# Patient Record
Sex: FEMALE | Race: OTHER | HISPANIC OR LATINO | Employment: UNEMPLOYED | URBAN - METROPOLITAN AREA
[De-identification: names, ages, dates, MRNs, and addresses within clinical notes are randomized per-mention and may not be internally consistent; named-entity substitution may affect disease eponyms.]

---

## 2023-10-04 ENCOUNTER — APPOINTMENT (EMERGENCY)
Dept: CT IMAGING | Facility: HOSPITAL | Age: 33
End: 2023-10-04

## 2023-10-04 ENCOUNTER — OFFICE VISIT (OUTPATIENT)
Dept: OBGYN CLINIC | Facility: CLINIC | Age: 33
End: 2023-10-04

## 2023-10-04 ENCOUNTER — HOSPITAL ENCOUNTER (EMERGENCY)
Facility: HOSPITAL | Age: 33
Discharge: HOME | End: 2023-10-04
Attending: EMERGENCY MEDICINE

## 2023-10-04 ENCOUNTER — APPOINTMENT (EMERGENCY)
Dept: ULTRASOUND IMAGING | Facility: HOSPITAL | Age: 33
End: 2023-10-04

## 2023-10-04 VITALS
TEMPERATURE: 98.8 F | SYSTOLIC BLOOD PRESSURE: 125 MMHG | DIASTOLIC BLOOD PRESSURE: 74 MMHG | HEART RATE: 64 BPM | OXYGEN SATURATION: 99 % | RESPIRATION RATE: 18 BRPM

## 2023-10-04 VITALS
DIASTOLIC BLOOD PRESSURE: 75 MMHG | SYSTOLIC BLOOD PRESSURE: 110 MMHG | HEIGHT: 59 IN | HEART RATE: 73 BPM | BODY MASS INDEX: 27.34 KG/M2 | WEIGHT: 135.6 LBS

## 2023-10-04 DIAGNOSIS — N84.0 ENDOMETRIAL POLYP: ICD-10-CM

## 2023-10-04 DIAGNOSIS — A74.9 CHLAMYDIA: ICD-10-CM

## 2023-10-04 DIAGNOSIS — R10.2 PELVIC PAIN: Primary | ICD-10-CM

## 2023-10-04 DIAGNOSIS — N93.9 ABNORMAL UTERINE BLEEDING (AUB): Primary | ICD-10-CM

## 2023-10-04 LAB
ALBUMIN SERPL BCP-MCNC: 4.1 G/DL (ref 3.5–5)
ALP SERPL-CCNC: 69 U/L (ref 34–104)
ALT SERPL W P-5'-P-CCNC: 20 U/L (ref 7–52)
ANION GAP SERPL CALCULATED.3IONS-SCNC: 4 MMOL/L
AST SERPL W P-5'-P-CCNC: 18 U/L (ref 13–39)
BASOPHILS # BLD AUTO: 0.02 THOUSANDS/ÂΜL (ref 0–0.1)
BASOPHILS NFR BLD AUTO: 0 % (ref 0–1)
BILIRUB SERPL-MCNC: 0.38 MG/DL (ref 0.2–1)
BILIRUB UR QL STRIP: NEGATIVE
BUN SERPL-MCNC: 10 MG/DL (ref 5–25)
CALCIUM SERPL-MCNC: 8.8 MG/DL (ref 8.4–10.2)
CHLORIDE SERPL-SCNC: 102 MMOL/L (ref 96–108)
CLARITY UR: CLEAR
CO2 SERPL-SCNC: 27 MMOL/L (ref 21–32)
COLOR UR: YELLOW
CREAT SERPL-MCNC: 0.64 MG/DL (ref 0.6–1.3)
EOSINOPHIL # BLD AUTO: 0.08 THOUSAND/ÂΜL (ref 0–0.61)
EOSINOPHIL NFR BLD AUTO: 2 % (ref 0–6)
ERYTHROCYTE [DISTWIDTH] IN BLOOD BY AUTOMATED COUNT: 13.3 % (ref 11.6–15.1)
EXT PREGNANCY TEST URINE: NEGATIVE
EXT. CONTROL: NORMAL
GFR SERPL CREATININE-BSD FRML MDRD: 117 ML/MIN/1.73SQ M
GLUCOSE SERPL-MCNC: 96 MG/DL (ref 65–140)
GLUCOSE UR STRIP-MCNC: NEGATIVE MG/DL
HCT VFR BLD AUTO: 40.3 % (ref 34.8–46.1)
HGB BLD-MCNC: 13.1 G/DL (ref 11.5–15.4)
HGB UR QL STRIP.AUTO: NEGATIVE
IMM GRANULOCYTES # BLD AUTO: 0.01 THOUSAND/UL (ref 0–0.2)
IMM GRANULOCYTES NFR BLD AUTO: 0 % (ref 0–2)
KETONES UR STRIP-MCNC: NEGATIVE MG/DL
LEUKOCYTE ESTERASE UR QL STRIP: NEGATIVE
LYMPHOCYTES # BLD AUTO: 1.92 THOUSANDS/ÂΜL (ref 0.6–4.47)
LYMPHOCYTES NFR BLD AUTO: 43 % (ref 14–44)
MCH RBC QN AUTO: 27.5 PG (ref 26.8–34.3)
MCHC RBC AUTO-ENTMCNC: 32.5 G/DL (ref 31.4–37.4)
MCV RBC AUTO: 85 FL (ref 82–98)
MONOCYTES # BLD AUTO: 0.26 THOUSAND/ÂΜL (ref 0.17–1.22)
MONOCYTES NFR BLD AUTO: 6 % (ref 4–12)
NEUTROPHILS # BLD AUTO: 2.16 THOUSANDS/ÂΜL (ref 1.85–7.62)
NEUTS SEG NFR BLD AUTO: 49 % (ref 43–75)
NITRITE UR QL STRIP: NEGATIVE
NRBC BLD AUTO-RTO: 0 /100 WBCS
PH UR STRIP.AUTO: 6 [PH] (ref 4.5–8)
PLATELET # BLD AUTO: 286 THOUSANDS/UL (ref 149–390)
PMV BLD AUTO: 10.1 FL (ref 8.9–12.7)
POTASSIUM SERPL-SCNC: 4.1 MMOL/L (ref 3.5–5.3)
PROT SERPL-MCNC: 7.8 G/DL (ref 6.4–8.4)
PROT UR STRIP-MCNC: NEGATIVE MG/DL
RBC # BLD AUTO: 4.76 MILLION/UL (ref 3.81–5.12)
SODIUM SERPL-SCNC: 133 MMOL/L (ref 135–147)
SP GR UR STRIP.AUTO: 1.01 (ref 1–1.03)
UROBILINOGEN UR QL STRIP.AUTO: 0.2 E.U./DL
WBC # BLD AUTO: 4.45 THOUSAND/UL (ref 4.31–10.16)

## 2023-10-04 PROCEDURE — 87491 CHLMYD TRACH DNA AMP PROBE: CPT | Performed by: EMERGENCY MEDICINE

## 2023-10-04 PROCEDURE — G1004 CDSM NDSC: HCPCS

## 2023-10-04 PROCEDURE — 76856 US EXAM PELVIC COMPLETE: CPT

## 2023-10-04 PROCEDURE — 36415 COLL VENOUS BLD VENIPUNCTURE: CPT | Performed by: EMERGENCY MEDICINE

## 2023-10-04 PROCEDURE — 99285 EMERGENCY DEPT VISIT HI MDM: CPT | Performed by: EMERGENCY MEDICINE

## 2023-10-04 PROCEDURE — 81025 URINE PREGNANCY TEST: CPT | Performed by: EMERGENCY MEDICINE

## 2023-10-04 PROCEDURE — 85025 COMPLETE CBC W/AUTO DIFF WBC: CPT | Performed by: EMERGENCY MEDICINE

## 2023-10-04 PROCEDURE — 96365 THER/PROPH/DIAG IV INF INIT: CPT

## 2023-10-04 PROCEDURE — 80053 COMPREHEN METABOLIC PANEL: CPT | Performed by: EMERGENCY MEDICINE

## 2023-10-04 PROCEDURE — 76830 TRANSVAGINAL US NON-OB: CPT

## 2023-10-04 PROCEDURE — 81003 URINALYSIS AUTO W/O SCOPE: CPT

## 2023-10-04 PROCEDURE — 99204 OFFICE O/P NEW MOD 45 MIN: CPT | Performed by: OBSTETRICS & GYNECOLOGY

## 2023-10-04 PROCEDURE — 74177 CT ABD & PELVIS W/CONTRAST: CPT

## 2023-10-04 PROCEDURE — 87591 N.GONORRHOEAE DNA AMP PROB: CPT | Performed by: EMERGENCY MEDICINE

## 2023-10-04 PROCEDURE — 96375 TX/PRO/DX INJ NEW DRUG ADDON: CPT

## 2023-10-04 PROCEDURE — 99284 EMERGENCY DEPT VISIT MOD MDM: CPT

## 2023-10-04 RX ORDER — KETOROLAC TROMETHAMINE 30 MG/ML
15 INJECTION, SOLUTION INTRAMUSCULAR; INTRAVENOUS ONCE
Status: COMPLETED | OUTPATIENT
Start: 2023-10-04 | End: 2023-10-04

## 2023-10-04 RX ORDER — IBUPROFEN 400 MG/1
400 TABLET ORAL EVERY 8 HOURS PRN
Qty: 15 TABLET | Refills: 0 | Status: SHIPPED | OUTPATIENT
Start: 2023-10-04

## 2023-10-04 RX ADMIN — KETOROLAC TROMETHAMINE 15 MG: 30 INJECTION, SOLUTION INTRAMUSCULAR; INTRAVENOUS at 07:38

## 2023-10-04 RX ADMIN — IOHEXOL 85 ML: 350 INJECTION, SOLUTION INTRAVENOUS at 08:47

## 2023-10-04 RX ADMIN — SODIUM CHLORIDE, SODIUM LACTATE, POTASSIUM CHLORIDE, AND CALCIUM CHLORIDE 500 ML: .6; .31; .03; .02 INJECTION, SOLUTION INTRAVENOUS at 07:34

## 2023-10-04 NOTE — Clinical Note
Grayson Hernandez accompanied Joon Shankar to the emergency department on 10/4/2023. Return date if applicable: 56/13/4947        If you have any questions or concerns, please don't hesitate to call.       Jean-Paul Yost MD

## 2023-10-04 NOTE — PROGRESS NOTES
OB/GYN VISIT  Bryonjerrod Densonevonnedouglas  10/4/2023  11:46 AM    Subjective:     Tata Curiel is a 35 y.o. G0 female who presents for follow-up after an ED visit today for pelvic pain. In the ED, TVUS showed a 1cm endometrial nodule with vascular stalk consistent with a polyp. CT showed no acute abdominopelvic findings but questionable polycystic appearance of ovaries. Patient states that her pain is in her bilateral lower abdomen and that it comes with the start of each period and persists for 2-3 days after. The pain comes on gradually and feels like a "straining" pain as if something is squeezing her. Her pain makes it hard for her to walk at times. Kermit Goff reports irregular periods that come about every 15 days and last about 5 days. They are often heavy and she changes her pad/tampon every time she goes to the bathroom. She does report leakage/bleed through and has frequent accidents. She has been trying to become pregnant regularly for approximately 1 year now with her partner. She has not noticed any deepening in her voice and sometimes has to pluck dark chin hairs. She has a family history of diabetes in her father and grandmother. She denies any other symptoms such as dysuria, abnormal discharge, dyspareunia, nausea/vomiting, or bowel symptoms. No past medical history on file. Past Surgical History:   Procedure Laterality Date   • KNEE SURGERY         Objective:    Vitals: Blood pressure 110/75, pulse 73, height 4' 11" (1.499 m), weight 61.5 kg (135 lb 9.6 oz), last menstrual period 09/29/2023. Body mass index is 27.39 kg/m². General: Well appearing, no distress  Respiratory: Unlabored breathing  Cardiovascular: Regular rate. Abdomen: Soft, non-distended, mild bilateral lower quadrant abdominal tenderness to palpation  Fundal Height: Appropriate for gestational age. Extremities: Warm and well perfused. Non tender. Assessment/Plan:  1.  Abnormal uterine bleeding (AUB)  Assessment & Plan:  - Likely secondary to endometrial polyp  - Plan for hysteroscopy D&C with polypectomy  - Discussed risks of procedure with patient such as uterine perforation, damage to surrounding organs, bleeding, infection, insufficient sampling  - Consent signed and scheduling form filled out  - Patient will come back for pre-op H&P  - Labs ordered to check hormone levels in case of other causes of bleeding    Orders:  -     TSH, 3rd generation with Free T4 reflex; Future  -     Follicle stimulating hormone; Future  -     Luteinizing hormone; Future  -     Estradiol; Future  -     Prolactin; Future        D/w Dr. Linda Sorto MD  10/4/23  11:46 AM        Please note that while the recent [address] permits medical records be visible for patient review, clinical documentation is intended for utilization by healthcare professionals. All test results are immediately available through [employer] as well, and we will review results at earliest opportunity and contact you with the appropriate urgency. If you have a question about something you see in your chart, please don't hesitate to ask about it at your next appointment.

## 2023-10-04 NOTE — ED PROVIDER NOTES
History  Chief Complaint   Patient presents with   • Pelvic Pain     B/l pelvic pain for two days, LMP "a few days ago", denies urinary s/s       History provided by:  Patient   used: No    Gradual onset worsening lower pelvic/lower abdominal pain. Her last menstrual period was a few days ago and was a little heavier than normal.  No urinary symptoms. No bowel symptoms. No significant nausea or vomiting. No fevers or chills. No prior abdominal surgery. The pain remains in the lower abdomen and has not localized to 1 particular area and does not really think left or right pain is worse. There is been no vaginal discharge. She is here with her . None       History reviewed. No pertinent past medical history. Past Surgical History:   Procedure Laterality Date   • KNEE SURGERY         History reviewed. No pertinent family history. I have reviewed and agree with the history as documented. E-Cigarette/Vaping     E-Cigarette/Vaping Substances     Social History     Substance Use Topics   • Alcohol use: Never   • Drug use: Never       Review of Systems    Physical Exam  Physical Exam  Vitals and nursing note reviewed. Constitutional:       General: She is not in acute distress. Appearance: Normal appearance. She is well-developed. She is not ill-appearing, toxic-appearing or diaphoretic. HENT:      Head: Normocephalic and atraumatic. Right Ear: Hearing normal. No drainage or swelling. Left Ear: Hearing normal. No drainage or swelling. Eyes:      General: Lids are normal.         Right eye: No discharge. Left eye: No discharge. Conjunctiva/sclera: Conjunctivae normal.   Neck:      Vascular: No JVD. Trachea: Trachea normal.   Cardiovascular:      Rate and Rhythm: Normal rate and regular rhythm. Pulses: Normal pulses. Heart sounds: Normal heart sounds. No murmur heard. No friction rub. No gallop.    Pulmonary:      Effort: Pulmonary effort is normal. No respiratory distress. Breath sounds: Normal breath sounds. No stridor. No wheezing or rales. Abdominal:      Palpations: Abdomen is soft. Tenderness: There is abdominal tenderness in the right lower quadrant, suprapubic area and left lower quadrant. There is no right CVA tenderness, left CVA tenderness, guarding or rebound. Negative signs include Hathaway's sign and McBurney's sign. Musculoskeletal:         General: Normal range of motion. Cervical back: Normal range of motion. Skin:     General: Skin is warm and dry. Coloration: Skin is not pale. Neurological:      General: No focal deficit present. Mental Status: She is alert. GCS: GCS eye subscore is 4. GCS verbal subscore is 5. GCS motor subscore is 6. Sensory: No sensory deficit. Motor: No abnormal muscle tone. Psychiatric:         Mood and Affect: Mood normal.         Speech: Speech normal.         Behavior: Behavior is cooperative.          Vital Signs  ED Triage Vitals [10/04/23 0630]   Temperature Pulse Respirations Blood Pressure SpO2   98.8 °F (37.1 °C) 64 18 125/74 99 %      Temp src Heart Rate Source Patient Position - Orthostatic VS BP Location FiO2 (%)   -- Monitor -- Right arm --      Pain Score       7           Vitals:    10/04/23 0630   BP: 125/74   Pulse: 64         Visual Acuity      ED Medications  Medications   ketorolac (TORADOL) injection 15 mg (15 mg Intravenous Given 10/4/23 0738)   lactated ringers bolus 500 mL (0 mL Intravenous Stopped 10/4/23 0840)   iohexol (OMNIPAQUE) 350 MG/ML injection (MULTI-DOSE) 85 mL (85 mL Intravenous Given 10/4/23 0847)       Diagnostic Studies  Results Reviewed     Procedure Component Value Units Date/Time    Comprehensive metabolic panel [[id number]]  (Abnormal) Collected: 10/04/23 0735    Lab Status: Final result Specimen: Blood from Arm, Right Updated: 10/04/23 8319     Sodium 133 mmol/L      Potassium 4.1 mmol/L      Chloride 102 mmol/L CO2 27 mmol/L      ANION GAP 4 mmol/L      BUN 10 mg/dL      Creatinine 0.64 mg/dL      Glucose 96 mg/dL      Calcium 8.8 mg/dL      AST 18 U/L      ALT 20 U/L      Alkaline Phosphatase 69 U/L      Total Protein 7.8 g/dL      Albumin 4.1 g/dL      Total Bilirubin 0.38 mg/dL      eGFR 117 ml/min/1.73sq m     Narrative:      National Kidney Disease Foundation guidelines for Chronic Kidney Disease (CKD):   •  Stage 1 with normal or high GFR (GFR > 90 mL/min/1.73 square meters)  •  Stage 2 Mild CKD (GFR = 60-89 mL/min/1.73 square meters)  •  Stage 3A Moderate CKD (GFR = 45-59 mL/min/1.73 square meters)  •  Stage 3B Moderate CKD (GFR = 30-44 mL/min/1.73 square meters)  •  Stage 4 Severe CKD (GFR = 15-29 mL/min/1.73 square meters)  •  Stage 5 End Stage CKD (GFR <15 mL/min/1.73 square meters)  Note: GFR calculation is accurate only with a steady state creatinine    CBC and differential [[id number]] Collected: 10/04/23 0735    Lab Status: Final result Specimen: Blood from Arm, Right Updated: 10/04/23 0745     WBC 4.45 Thousand/uL      RBC 4.76 Million/uL      Hemoglobin 13.1 g/dL      Hematocrit 40.3 %      MCV 85 fL      MCH 27.5 pg      MCHC 32.5 g/dL      RDW 13.3 %      MPV 10.1 fL      Platelets 445 Thousands/uL      nRBC 0 /100 WBCs      Neutrophils Relative 49 %      Immat GRANS % 0 %      Lymphocytes Relative 43 %      Monocytes Relative 6 %      Eosinophils Relative 2 %      Basophils Relative 0 %      Neutrophils Absolute 2.16 Thousands/µL      Immature Grans Absolute 0.01 Thousand/uL      Lymphocytes Absolute 1.92 Thousands/µL      Monocytes Absolute 0.26 Thousand/µL      Eosinophils Absolute 0.08 Thousand/µL      Basophils Absolute 0.02 Thousands/µL     Chlamydia/GC amplified DNA by PCR [[id number]] Collected: 10/04/23 0725    Lab Status:  In process Specimen: Urine, Other Updated: 10/04/23 0732    POCT pregnancy, urine [[id number]]  (Normal) Resulted: 10/04/23 0731    Lab Status: Final result Updated: 10/04/23 7542     EXT Preg Test, Ur Negative     Control Valid    Urine Macroscopic, POC [[id number]] Collected: 10/04/23 0726    Lab Status: Final result Specimen: Urine Updated: 10/04/23 0728     Color, UA Yellow     Clarity, UA Clear     pH, UA 6.0     Leukocytes, UA Negative     Nitrite, UA Negative     Protein, UA Negative mg/dl      Glucose, UA Negative mg/dl      Ketones, UA Negative mg/dl      Urobilinogen, UA 0.2 E.U./dl      Bilirubin, UA Negative     Occult Blood, UA Negative     Specific Gravity, UA 1.015    Narrative:      CLINITEK RESULT                 US pelvis complete w transvaginal   Final Result by Court Ortiz MD (10/04 1121)         1. 1 cm endometrial nodule with a vascular stalk, most likely a polyp. Other etiologies not excluded. Nonemergent gynecological assessment and follow-up advised. Workstation performed: [id number]         CT abdomen pelvis with contrast   ED Interpretation by Lashawn Dean MD (10/04 4834)   I have reviewed the film, per my independent interpretation : no obstruction or free air. Possible ovarian cyst. Formal read per radiology.'      Final Result by Armani Keys MD (10/04 1923)      No acute abdominopelvic findings. Workstation performed: [id number]                    Procedures  Procedures         ED Course  ED Course as of 10/04/23 1200   Wed Oct 04, 2023   0746 CBC and differential  Normal white count, hemoglobin and platelet count. 2087 PREGNANCY TEST URINE: Negative  Pregnancy negative   0746 Urine Macroscopic, POC  No evidence of UTI   0952 The patients pelvic pain is slightly better. CT reviewed and as she is still having pain, US ordered. Pain not exactly consistent with ovarian torsion. Medical Decision Making  Patient with pelvic pain. There is no UTI. She is not pregnant. There is no ovarian cyst or ovarian torsion evident.   She has not endometrial polyps which I think is an incidental finding most likely not causing pain. There is no signs or symptoms of an STD but I did send off STD testing which is pending upon discharge. She has no OB/GYN doctor and has no insurance so I [facility] and told her she needs to follow-up. She has no fever to suggest an infection at this point. Okay for discharge. Amount and/or Complexity of Data Reviewed  Labs: ordered. Decision-making details documented in ED Course. Radiology: ordered. Risk  Prescription drug management. Disposition  Final diagnoses:   Pelvic pain   Endometrial polyp     Time reflects when diagnosis was documented in both MDM as applicable and the Disposition within this note     Time User Action Codes Description Comment    10/4/2023 11:56 AM Nesha Sers Add [R10.2] Pelvic pain     10/4/2023 11:57 AM Nesha Sers Add [N84.0] Endometrial polyp       ED Disposition     ED Disposition   Discharge    Condition   Stable    Date/Time   Wed Oct 4, 2023 11:56 AM    Comment   [id number] [facility] discharge to home/self care.                Follow-up Information     Follow up With Specialties Details Why [address] [place] [address] Obstetrics and Gynecology Schedule an appointment as soon as possible for a visit in 1 week reevaluation [address] [place] [id number]  [address] [place] [address], [address] [place] [address], [address] [place] [address], [id number] [phone]          Patient's Medications   Discharge Prescriptions    IBUPROFEN (MOTRIN) 400 MG TABLET    Take 1 tablet (400 mg total) by mouth every 8 (eight) hours as needed for mild pain or moderate pain       Start Date: 10/4/2023 End Date: --       Order Dose: 400 mg       Quantity: 15 tablet    Refills: 0           PDMP Review None          ED Provider  Electronically Signed by           Lauro Shelton MD  10/04/23 1200

## 2023-10-05 ENCOUNTER — APPOINTMENT (OUTPATIENT)
Dept: LAB | Facility: CLINIC | Age: 33
End: 2023-10-05

## 2023-10-05 DIAGNOSIS — N93.9 ABNORMAL UTERINE BLEEDING (AUB): ICD-10-CM

## 2023-10-05 LAB
C TRACH DNA SPEC QL NAA+PROBE: POSITIVE
ESTRADIOL SERPL-MCNC: 38.2 PG/ML
FSH SERPL-ACNC: 8.8 MIU/ML
LH SERPL-ACNC: 11.3 MIU/ML
N GONORRHOEA DNA SPEC QL NAA+PROBE: NEGATIVE
PROLACTIN SERPL-MCNC: 11.42 NG/ML (ref 3.34–26.72)
TSH SERPL DL<=0.05 MIU/L-ACNC: 3.01 UIU/ML (ref 0.45–4.5)

## 2023-10-05 PROCEDURE — 84443 ASSAY THYROID STIM HORMONE: CPT

## 2023-10-05 PROCEDURE — 82670 ASSAY OF TOTAL ESTRADIOL: CPT

## 2023-10-05 PROCEDURE — 83001 ASSAY OF GONADOTROPIN (FSH): CPT

## 2023-10-05 PROCEDURE — 83002 ASSAY OF GONADOTROPIN (LH): CPT

## 2023-10-05 PROCEDURE — 84146 ASSAY OF PROLACTIN: CPT

## 2023-10-05 PROCEDURE — 36415 COLL VENOUS BLD VENIPUNCTURE: CPT

## 2023-10-05 NOTE — ASSESSMENT & PLAN NOTE
- Likely secondary to endometrial polyp  - Plan for hysteroscopy D&C with polypectomy  - Discussed risks of procedure with patient such as uterine perforation, damage to surrounding organs, bleeding, infection, insufficient sampling  - Consent signed and scheduling form filled out  - Patient will come back for pre-op H&P  - Labs ordered to check hormone levels in case of other causes of bleeding

## 2023-10-08 NOTE — RESULT ENCOUNTER NOTE
[id number]. Attempted to contact patient. Someone initally answered, however it seemed to be an accident since there was only background noise and no response. Called back and reached voicemail.  LMOM

## 2023-10-09 RX ORDER — DOXYCYCLINE HYCLATE 100 MG/1
100 CAPSULE ORAL 2 TIMES DAILY
Qty: 14 CAPSULE | Refills: 0 | Status: SHIPPED | OUTPATIENT
Start: 2023-10-09 | End: 2023-10-16

## 2023-10-12 ENCOUNTER — TELEPHONE (OUTPATIENT)
Dept: OBGYN CLINIC | Facility: CLINIC | Age: 33
End: 2023-10-12

## 2023-10-16 ENCOUNTER — TELEPHONE (OUTPATIENT)
Dept: LABOR AND DELIVERY | Facility: HOSPITAL | Age: 33
End: 2023-10-16

## 2023-10-16 NOTE — TELEPHONE ENCOUNTER
Called patient and discussed lab results as well as ultrasound results. Plan is for removal of polyp, and then possibility of starting her on a medication to induce ovulation to help with fertility. Patient does state she is having cramping today, suggested using motrin or midol as well as heating pad. Office will call her to schedule pre-op visit.

## 2023-10-31 DIAGNOSIS — A74.9 CHLAMYDIA: Primary | ICD-10-CM

## 2023-10-31 NOTE — PROGRESS NOTES
Patient's  Amado Rashid had appointment with me today, she has no insurance and no PCP, tested positive for chlamydia on 10/4/23 and was treated in ER. Ordered test of cure urine for patient, printed to be completed. Patient reports compliance with 7 day treatment of doxycycline.

## 2023-11-21 ENCOUNTER — TELEPHONE (OUTPATIENT)
Dept: OBGYN CLINIC | Facility: CLINIC | Age: 33
End: 2023-11-21

## 2023-11-22 LAB
EXTERNAL CHLAMYDIA RESULT: NOT DETECTED
N GONORRHOEA RRNA SPEC QL PROBE: NOT DETECTED

## 2023-11-27 ENCOUNTER — PREP FOR PROCEDURE (OUTPATIENT)
Dept: GYNECOLOGY | Facility: CLINIC | Age: 33
End: 2023-11-27

## 2023-11-27 DIAGNOSIS — N93.9 ABNORMAL UTERINE BLEEDING (AUB): ICD-10-CM

## 2023-11-27 DIAGNOSIS — R10.2 PELVIC PAIN: ICD-10-CM

## 2023-11-27 DIAGNOSIS — D25.9 UTERINE LEIOMYOMA, UNSPECIFIED LOCATION: Primary | ICD-10-CM

## 2023-11-28 ENCOUNTER — TELEPHONE (OUTPATIENT)
Dept: FAMILY MEDICINE CLINIC | Facility: CLINIC | Age: 33
End: 2023-11-28

## 2023-11-28 ENCOUNTER — OFFICE VISIT (OUTPATIENT)
Dept: OBGYN CLINIC | Facility: CLINIC | Age: 33
End: 2023-11-28

## 2023-11-28 VITALS
BODY MASS INDEX: 27.63 KG/M2 | SYSTOLIC BLOOD PRESSURE: 110 MMHG | WEIGHT: 136.8 LBS | HEART RATE: 79 BPM | DIASTOLIC BLOOD PRESSURE: 73 MMHG

## 2023-11-28 DIAGNOSIS — Z01.818 PREOPERATIVE EXAMINATION: Primary | ICD-10-CM

## 2023-11-28 DIAGNOSIS — N93.9 ABNORMAL UTERINE BLEEDING (AUB): ICD-10-CM

## 2023-11-28 PROCEDURE — 99214 OFFICE O/P EST MOD 30 MIN: CPT | Performed by: OBSTETRICS & GYNECOLOGY

## 2023-11-28 RX ORDER — MISOPROSTOL 200 UG/1
TABLET ORAL
Qty: 2 TABLET | Refills: 0 | Status: SHIPPED | OUTPATIENT
Start: 2023-11-28

## 2023-11-28 NOTE — H&P (VIEW-ONLY)
Assessment Diagnoses and all orders for this visit:    Preoperative examination  -     miSOPROStol (Cytotec) 200 mcg tablet; [place] yaz tableta por via oral la noche anterior al precedimiento luego tome un inserto de tableta por via intravaginal la manana del procedimiento a las 5:30am    Abnormal uterine bleeding (AUB)  -     miSOPROStol (Cytotec) 200 mcg tablet; [place] yaz tableta por via oral la noche anterior al precedimiento luego tome un inserto de tableta por via intravaginal la manana del procedimiento a las 5:30am         Tian Ulrich is scheduled for  [facility]  Hysteroscopy with polypectomy  on 12/1/23. Pre-op instructions, including showering with Hibiclens, discussed with patient and patient received paper copy. The risks, benefits and alternatives to the procedure were discussed. We discussed the risks of pain, bleeding, blood clot, infection, allergic reaction, neurovascular injury, injury to uterus, injury to surrounding structures such as bowel, bladder and/or ureters, and possibility of inability to complete the procedure. We discussed code status - full code. Blood transfusion is acceptable. We discussed resident physician participation in the procedure, including pelvic exam.  All questions answered, consent obtained. Subjective     Cristina Ulrich is a 35 y.o. female here for a pre-op consultation. She is without complaint today. Patient Active Problem List   Diagnosis    Abnormal uterine bleeding (AUB)         Gynecologic History  Patient's last menstrual period was 11/23/2023 (exact date). Contraception: none      Obstetric History  OB History   No obstetric history on file. Past Medical/Surgical/Family/Social History    No past medical history on file. Past Surgical History:   Procedure Laterality Date    KNEE SURGERY       No family history on file.   Social History     Socioeconomic History    Marital status: Single     Spouse name: Not on file    Number of children: Not on file    Years of education: Not on file    Highest education level: Not on file   Occupational History    Not on file   Tobacco Use    Smoking status: Not on file    Smokeless tobacco: Not on file   Substance and Sexual Activity    Alcohol use: Never    Drug use: Never    Sexual activity: Yes   Other Topics Concern    Not on file   Social History Narrative    Not on file     Social Determinants of Health     Financial Resource Strain: Low Risk  (10/4/2023)    Overall Financial Resource Strain (CARDIA)     Difficulty of Paying Living Expenses: Not hard at all   Food Insecurity: No Food Insecurity (10/4/2023)    Hunger Vital Sign     Worried About Running Out of Food in the Last Year: Never true     [address] in the Last Year: Never true   Transportation Needs: No Transportation Needs (10/4/2023)    PRAPARE - Transportation     Lack of Transportation (Medical): No     Lack of Transportation (Non-Medical): No   Physical Activity: Not on file   Stress: Not on file   Social Connections: Not on file   Intimate Partner Violence: Not on file   Housing Stability: Not on file         Patient has no known allergies.     Current Outpatient Medications:     ibuprofen (MOTRIN) 400 mg tablet, Take 1 tablet (400 mg total) by mouth every 8 (eight) hours as needed for mild pain or moderate pain, Disp: 15 tablet, Rfl: 0    miSOPROStol (Cytotec) 200 mcg tablet, [place] yaz tableta por via oral la noche anterior al precedimiento luego tome un inserto de tableta por via intravaginal la manana del procedimiento a las 5:30am, Disp: 2 tablet, Rfl: 0      Review of Systems  Constitutional: no fever, feels well  CV: No complaints of chest pain, palpitations  Pulm: No shortness of breath or dyspnea on exertion  Gastrointestinal: no complaints of abdominal pain, nausea  Genitourinary : no complaints of dysuria, vaginal discharge       Objective     /73   Pulse 79   Wt 62.1 kg (136 lb 12.8 oz) LMP 11/23/2023 (Exact Date)   BMI 27.63 kg/m²     GEN: The patient was alert and oriented x3, pleasant well-appearing female in no acute distress.    CV: Regular rate and rhythm  PULM: nonlabored respirations, CTAB  ABD: BS positive, soft, nontender  : deferred  EXT: No calf tenderness  MSK: Normal gait  Skin: warm, dry  Neuro: no focal deficits  Psych: normal affect and judgement, cooperative

## 2023-11-28 NOTE — PROGRESS NOTES
Assessment Diagnoses and all orders for this visit:    Preoperative examination  -     miSOPROStol (Cytotec) 200 mcg tablet; [place] yaz tableta por via oral la noche anterior al precedimiento luego tome un inserto de tableta por via intravaginal la manana del procedimiento a las 5:30am    Abnormal uterine bleeding (AUB)  -     miSOPROStol (Cytotec) 200 mcg tablet; [place] yaz tableta por via oral la noche anterior al precedimiento luego tome un inserto de tableta por via intravaginal la manana del procedimiento a las 5:30am         Tian Ulrich is scheduled for  [facility]  Hysteroscopy with polypectomy  on 12/1/23. Pre-op instructions, including showering with Hibiclens, discussed with patient and patient received paper copy. The risks, benefits and alternatives to the procedure were discussed. We discussed the risks of pain, bleeding, blood clot, infection, allergic reaction, neurovascular injury, injury to uterus, injury to surrounding structures such as bowel, bladder and/or ureters, and possibility of inability to complete the procedure. We discussed code status - full code. Blood transfusion is acceptable. We discussed resident physician participation in the procedure, including pelvic exam.  All questions answered, consent obtained. Subjective     Cristina Ulrich is a 35 y.o. female here for a pre-op consultation. She is without complaint today. Patient Active Problem List   Diagnosis    Abnormal uterine bleeding (AUB)         Gynecologic History  Patient's last menstrual period was 11/23/2023 (exact date). Contraception: none      Obstetric History  OB History   No obstetric history on file. Past Medical/Surgical/Family/Social History    No past medical history on file. Past Surgical History:   Procedure Laterality Date    KNEE SURGERY       No family history on file.   Social History     Socioeconomic History    Marital status: Single     Spouse name: Not on file    Number of children: Not on file    Years of education: Not on file    Highest education level: Not on file   Occupational History    Not on file   Tobacco Use    Smoking status: Not on file    Smokeless tobacco: Not on file   Substance and Sexual Activity    Alcohol use: Never    Drug use: Never    Sexual activity: Yes   Other Topics Concern    Not on file   Social History Narrative    Not on file     Social Determinants of Health     Financial Resource Strain: Low Risk  (10/4/2023)    Overall Financial Resource Strain (CARDIA)     Difficulty of Paying Living Expenses: Not hard at all   Food Insecurity: No Food Insecurity (10/4/2023)    Hunger Vital Sign     Worried About Running Out of Food in the Last Year: Never true     [address] in the Last Year: Never true   Transportation Needs: No Transportation Needs (10/4/2023)    PRAPARE - Transportation     Lack of Transportation (Medical): No     Lack of Transportation (Non-Medical): No   Physical Activity: Not on file   Stress: Not on file   Social Connections: Not on file   Intimate Partner Violence: Not on file   Housing Stability: Not on file         Patient has no known allergies.     Current Outpatient Medications:     ibuprofen (MOTRIN) 400 mg tablet, Take 1 tablet (400 mg total) by mouth every 8 (eight) hours as needed for mild pain or moderate pain, Disp: 15 tablet, Rfl: 0    miSOPROStol (Cytotec) 200 mcg tablet, [place] yaz tableta por via oral la noche anterior al precedimiento luego tome un inserto de tableta por via intravaginal la manana del procedimiento a las 5:30am, Disp: 2 tablet, Rfl: 0      Review of Systems  Constitutional: no fever, feels well  CV: No complaints of chest pain, palpitations  Pulm: No shortness of breath or dyspnea on exertion  Gastrointestinal: no complaints of abdominal pain, nausea  Genitourinary : no complaints of dysuria, vaginal discharge       Objective     /73   Pulse 79   Wt 62.1 kg (136 lb 12.8 oz) LMP 11/23/2023 (Exact Date)   BMI 27.63 kg/m²     GEN: The patient was alert and oriented x3, pleasant well-appearing female in no acute distress.    CV: Regular rate and rhythm  PULM: nonlabored respirations, CTAB  ABD: BS positive, soft, nontender  : deferred  EXT: No calf tenderness  MSK: Normal gait  Skin: warm, dry  Neuro: no focal deficits  Psych: normal affect and judgement, cooperative

## 2023-11-28 NOTE — PATIENT INSTRUCTIONS
Donavan por cervantes confianza en nuestro equipo. Leandrew Nigh y agradecemos campos comentarios. Si recibe yaz encuesta nuestra, tómese unos momentos para informarnos cómo estamos.    Sinceramente,  [facility], DO

## 2023-11-29 ENCOUNTER — ANESTHESIA EVENT (OUTPATIENT)
Dept: PERIOP | Facility: HOSPITAL | Age: 33
End: 2023-11-29
Payer: COMMERCIAL

## 2023-11-29 NOTE — PRE-PROCEDURE INSTRUCTIONS
Pre-Surgery Instructions:   Medication Instructions    ibuprofen (MOTRIN) 400 mg tablet Stop taking 2 days prior to surgery. miSOPROStol (Cytotec) 200 mcg tablet Instructions provided by MD   Phone assessment done 2 days prior to surgery. Medication instructions for day surgery reviewed. Please use only a sip of water to take your instructed medications. Avoid all over the counter vitamins, supplements and NSAIDS for one week prior to surgery per anesthesia guidelines. Tylenol is ok to take as needed. You will receive a call one business day prior to surgery with an arrival time and hospital directions. If your surgery is scheduled on a Monday, the hospital will be calling you on the Friday prior to your surgery. If you have not heard from anyone by 8pm, please call the hospital supervisor through the hospital  at [phone]. Ricardo Mcdonnell [phone]). Do not eat or drink anything after midnight the night before your surgery, including candy, mints, lifesavers, or chewing gum. Do not drink alcohol 24hrs before your surgery. Try not to smoke at least 24hrs before your surgery. Follow the pre surgery showering instructions as listed in the [facility] Surgical Experience Booklet” or otherwise provided by your surgeon's office. Do not use a blade to shave the surgical area 1 week before surgery. It is okay to use a clean electric clippers up to 24 hours before surgery. Do not apply any lotions, creams, including makeup, cologne, deodorant, or perfumes after showering on the day of your surgery. Do not use dry shampoo, hair spray, hair gel, or any type of hair products. No contact lenses, eye make-up, or artificial eyelashes. Remove nail polish, including gel polish, and any artificial, gel, or acrylic nails if possible. Remove all jewelry including rings and body piercing jewelry. Wear causal clothing that is easy to take on and off. Consider your type of surgery.     Keep any valuables, jewelry, piercings at home. Please bring any specially ordered equipment (sling, braces) if indicated. Arrange for a responsible person to drive you to and from the hospital on the day of your surgery. Visitor Guidelines discussed. Call the surgeon's office with any new illnesses, exposures, or additional questions prior to surgery. Please reference your [facility] Surgical Experience Booklet” for additional information to prepare for your upcoming surgery.

## 2023-12-01 ENCOUNTER — HOSPITAL ENCOUNTER (OUTPATIENT)
Facility: HOSPITAL | Age: 33
Setting detail: OUTPATIENT SURGERY
Discharge: HOME | End: 2023-12-01
Attending: OBSTETRICS & GYNECOLOGY | Admitting: OBSTETRICS & GYNECOLOGY
Payer: COMMERCIAL

## 2023-12-01 ENCOUNTER — ANESTHESIA (OUTPATIENT)
Dept: PERIOP | Facility: HOSPITAL | Age: 33
End: 2023-12-01
Payer: COMMERCIAL

## 2023-12-01 VITALS
BODY MASS INDEX: 27.87 KG/M2 | RESPIRATION RATE: 17 BRPM | TEMPERATURE: 97.7 F | OXYGEN SATURATION: 98 % | HEART RATE: 70 BPM | WEIGHT: 138.01 LBS | SYSTOLIC BLOOD PRESSURE: 111 MMHG | DIASTOLIC BLOOD PRESSURE: 62 MMHG

## 2023-12-01 DIAGNOSIS — N93.9 ABNORMAL UTERINE BLEEDING (AUB): ICD-10-CM

## 2023-12-01 DIAGNOSIS — R10.2 PELVIC PAIN: ICD-10-CM

## 2023-12-01 DIAGNOSIS — D25.9 UTERINE LEIOMYOMA, UNSPECIFIED LOCATION: ICD-10-CM

## 2023-12-01 LAB
ABO GROUP BLD: NORMAL
ABO GROUP BLD: NORMAL
ANION GAP SERPL CALCULATED.3IONS-SCNC: 5 MMOL/L
BASOPHILS # BLD AUTO: 0.03 THOUSANDS/ÂΜL (ref 0–0.1)
BASOPHILS NFR BLD AUTO: 0 % (ref 0–1)
BLD GP AB SCN SERPL QL: NEGATIVE
BUN SERPL-MCNC: 11 MG/DL (ref 5–25)
CALCIUM SERPL-MCNC: 8.6 MG/DL (ref 8.4–10.2)
CHLORIDE SERPL-SCNC: 102 MMOL/L (ref 96–108)
CO2 SERPL-SCNC: 26 MMOL/L (ref 21–32)
CREAT SERPL-MCNC: 0.68 MG/DL (ref 0.6–1.3)
EOSINOPHIL # BLD AUTO: 0.12 THOUSAND/ÂΜL (ref 0–0.61)
EOSINOPHIL NFR BLD AUTO: 1 % (ref 0–6)
ERYTHROCYTE [DISTWIDTH] IN BLOOD BY AUTOMATED COUNT: 13.4 % (ref 11.6–15.1)
EXT PREGNANCY TEST URINE: NEGATIVE
EXT. CONTROL: NORMAL
GFR SERPL CREATININE-BSD FRML MDRD: 115 ML/MIN/1.73SQ M
GLUCOSE P FAST SERPL-MCNC: 101 MG/DL (ref 65–99)
GLUCOSE SERPL-MCNC: 101 MG/DL (ref 65–140)
HCT VFR BLD AUTO: 36.5 % (ref 34.8–46.1)
HGB BLD-MCNC: 12.6 G/DL (ref 11.5–15.4)
IMM GRANULOCYTES # BLD AUTO: 0.14 THOUSAND/UL (ref 0–0.2)
IMM GRANULOCYTES NFR BLD AUTO: 2 % (ref 0–2)
LYMPHOCYTES # BLD AUTO: 1.72 THOUSANDS/ÂΜL (ref 0.6–4.47)
LYMPHOCYTES NFR BLD AUTO: 21 % (ref 14–44)
MCH RBC QN AUTO: 28.6 PG (ref 26.8–34.3)
MCHC RBC AUTO-ENTMCNC: 34.5 G/DL (ref 31.4–37.4)
MCV RBC AUTO: 83 FL (ref 82–98)
MONOCYTES # BLD AUTO: 0.58 THOUSAND/ÂΜL (ref 0.17–1.22)
MONOCYTES NFR BLD AUTO: 7 % (ref 4–12)
NEUTROPHILS # BLD AUTO: 5.82 THOUSANDS/ÂΜL (ref 1.85–7.62)
NEUTS SEG NFR BLD AUTO: 69 % (ref 43–75)
NRBC BLD AUTO-RTO: 0 /100 WBCS
PLATELET # BLD AUTO: 294 THOUSANDS/UL (ref 149–390)
PMV BLD AUTO: 10.1 FL (ref 8.9–12.7)
POTASSIUM SERPL-SCNC: 4.1 MMOL/L (ref 3.5–5.3)
RBC # BLD AUTO: 4.4 MILLION/UL (ref 3.81–5.12)
RH BLD: POSITIVE
RH BLD: POSITIVE
SODIUM SERPL-SCNC: 133 MMOL/L (ref 135–147)
SPECIMEN EXPIRATION DATE: NORMAL
WBC # BLD AUTO: 8.41 THOUSAND/UL (ref 4.31–10.16)

## 2023-12-01 PROCEDURE — 88305 TISSUE EXAM BY PATHOLOGIST: CPT | Performed by: PATHOLOGY

## 2023-12-01 PROCEDURE — 81025 URINE PREGNANCY TEST: CPT | Performed by: ANESTHESIOLOGY

## 2023-12-01 PROCEDURE — 85025 COMPLETE CBC W/AUTO DIFF WBC: CPT | Performed by: OBSTETRICS & GYNECOLOGY

## 2023-12-01 PROCEDURE — 86901 BLOOD TYPING SEROLOGIC RH(D): CPT | Performed by: OBSTETRICS & GYNECOLOGY

## 2023-12-01 PROCEDURE — 86900 BLOOD TYPING SEROLOGIC ABO: CPT | Performed by: OBSTETRICS & GYNECOLOGY

## 2023-12-01 PROCEDURE — 58558 HYSTEROSCOPY BIOPSY: CPT | Performed by: OBSTETRICS & GYNECOLOGY

## 2023-12-01 PROCEDURE — 86850 RBC ANTIBODY SCREEN: CPT | Performed by: OBSTETRICS & GYNECOLOGY

## 2023-12-01 PROCEDURE — 80048 BASIC METABOLIC PNL TOTAL CA: CPT | Performed by: OBSTETRICS & GYNECOLOGY

## 2023-12-01 RX ORDER — FENTANYL CITRATE 50 UG/ML
INJECTION, SOLUTION INTRAMUSCULAR; INTRAVENOUS AS NEEDED
Status: DISCONTINUED | OUTPATIENT
Start: 2023-12-01 | End: 2023-12-01

## 2023-12-01 RX ORDER — ONDANSETRON 2 MG/ML
INJECTION INTRAMUSCULAR; INTRAVENOUS AS NEEDED
Status: DISCONTINUED | OUTPATIENT
Start: 2023-12-01 | End: 2023-12-01

## 2023-12-01 RX ORDER — ONDANSETRON 2 MG/ML
4 INJECTION INTRAMUSCULAR; INTRAVENOUS EVERY 6 HOURS PRN
Status: DISCONTINUED | OUTPATIENT
Start: 2023-12-01 | End: 2023-12-01 | Stop reason: HOSPADM

## 2023-12-01 RX ORDER — PROPOFOL 10 MG/ML
INJECTION, EMULSION INTRAVENOUS AS NEEDED
Status: DISCONTINUED | OUTPATIENT
Start: 2023-12-01 | End: 2023-12-01

## 2023-12-01 RX ORDER — ACETAMINOPHEN 325 MG/1
975 TABLET ORAL EVERY 6 HOURS PRN
Status: DISCONTINUED | OUTPATIENT
Start: 2023-12-01 | End: 2023-12-01 | Stop reason: HOSPADM

## 2023-12-01 RX ORDER — ONDANSETRON 2 MG/ML
4 INJECTION INTRAMUSCULAR; INTRAVENOUS ONCE AS NEEDED
Status: DISCONTINUED | OUTPATIENT
Start: 2023-12-01 | End: 2023-12-01 | Stop reason: HOSPADM

## 2023-12-01 RX ORDER — MIDAZOLAM HYDROCHLORIDE 2 MG/2ML
INJECTION, SOLUTION INTRAMUSCULAR; INTRAVENOUS AS NEEDED
Status: DISCONTINUED | OUTPATIENT
Start: 2023-12-01 | End: 2023-12-01

## 2023-12-01 RX ORDER — FENTANYL CITRATE/PF 50 MCG/ML
25 SYRINGE (ML) INJECTION
Status: DISCONTINUED | OUTPATIENT
Start: 2023-12-01 | End: 2023-12-01 | Stop reason: HOSPADM

## 2023-12-01 RX ORDER — DEXAMETHASONE SODIUM PHOSPHATE 10 MG/ML
INJECTION, SOLUTION INTRAMUSCULAR; INTRAVENOUS AS NEEDED
Status: DISCONTINUED | OUTPATIENT
Start: 2023-12-01 | End: 2023-12-01

## 2023-12-01 RX ORDER — SODIUM CHLORIDE, SODIUM LACTATE, POTASSIUM CHLORIDE, CALCIUM CHLORIDE 600; 310; 30; 20 MG/100ML; MG/100ML; MG/100ML; MG/100ML
125 INJECTION, SOLUTION INTRAVENOUS CONTINUOUS
Status: DISCONTINUED | OUTPATIENT
Start: 2023-12-01 | End: 2023-12-01 | Stop reason: HOSPADM

## 2023-12-01 RX ORDER — MAGNESIUM HYDROXIDE 1200 MG/15ML
LIQUID ORAL AS NEEDED
Status: DISCONTINUED | OUTPATIENT
Start: 2023-12-01 | End: 2023-12-01 | Stop reason: HOSPADM

## 2023-12-01 RX ORDER — HYDROMORPHONE HCL/PF 1 MG/ML
0.5 SYRINGE (ML) INJECTION
Status: DISCONTINUED | OUTPATIENT
Start: 2023-12-01 | End: 2023-12-01 | Stop reason: HOSPADM

## 2023-12-01 RX ORDER — LIDOCAINE HYDROCHLORIDE 20 MG/ML
INJECTION, SOLUTION EPIDURAL; INFILTRATION; INTRACAUDAL; PERINEURAL AS NEEDED
Status: DISCONTINUED | OUTPATIENT
Start: 2023-12-01 | End: 2023-12-01

## 2023-12-01 RX ORDER — MEPERIDINE HYDROCHLORIDE 25 MG/ML
12.5 INJECTION INTRAMUSCULAR; INTRAVENOUS; SUBCUTANEOUS
Status: DISCONTINUED | OUTPATIENT
Start: 2023-12-01 | End: 2023-12-01 | Stop reason: HOSPADM

## 2023-12-01 RX ORDER — IBUPROFEN 600 MG/1
600 TABLET ORAL EVERY 6 HOURS PRN
Status: DISCONTINUED | OUTPATIENT
Start: 2023-12-01 | End: 2023-12-01 | Stop reason: HOSPADM

## 2023-12-01 RX ORDER — KETOROLAC TROMETHAMINE 30 MG/ML
INJECTION, SOLUTION INTRAMUSCULAR; INTRAVENOUS AS NEEDED
Status: DISCONTINUED | OUTPATIENT
Start: 2023-12-01 | End: 2023-12-01

## 2023-12-01 RX ADMIN — SODIUM CHLORIDE, SODIUM LACTATE, POTASSIUM CHLORIDE, AND CALCIUM CHLORIDE: .6; .31; .03; .02 INJECTION, SOLUTION INTRAVENOUS at 10:31

## 2023-12-01 RX ADMIN — SODIUM CHLORIDE, SODIUM LACTATE, POTASSIUM CHLORIDE, AND CALCIUM CHLORIDE 125 ML/HR: .6; .31; .03; .02 INJECTION, SOLUTION INTRAVENOUS at 08:14

## 2023-12-01 RX ADMIN — PROPOFOL 200 MG: 10 INJECTION, EMULSION INTRAVENOUS at 09:55

## 2023-12-01 RX ADMIN — FENTANYL CITRATE 50 MCG: 50 INJECTION INTRAMUSCULAR; INTRAVENOUS at 10:00

## 2023-12-01 RX ADMIN — SODIUM CHLORIDE, SODIUM LACTATE, POTASSIUM CHLORIDE, AND CALCIUM CHLORIDE 125 ML/HR: .6; .31; .03; .02 INJECTION, SOLUTION INTRAVENOUS at 11:16

## 2023-12-01 RX ADMIN — IBUPROFEN 600 MG: 600 TABLET, FILM COATED ORAL at 12:21

## 2023-12-01 RX ADMIN — FENTANYL CITRATE 50 MCG: 50 INJECTION INTRAMUSCULAR; INTRAVENOUS at 10:26

## 2023-12-01 RX ADMIN — KETOROLAC TROMETHAMINE 30 MG: 30 INJECTION, SOLUTION INTRAMUSCULAR at 10:30

## 2023-12-01 RX ADMIN — MIDAZOLAM 2 MG: 1 INJECTION INTRAMUSCULAR; INTRAVENOUS at 09:50

## 2023-12-01 RX ADMIN — LIDOCAINE HYDROCHLORIDE 100 MG: 20 INJECTION, SOLUTION EPIDURAL; INFILTRATION; INTRACAUDAL at 09:55

## 2023-12-01 RX ADMIN — ONDANSETRON 4 MG: 2 INJECTION INTRAMUSCULAR; INTRAVENOUS at 10:25

## 2023-12-01 RX ADMIN — DEXAMETHASONE SODIUM PHOSPHATE 10 MG: 10 INJECTION INTRAMUSCULAR; INTRAVENOUS at 10:00

## 2023-12-01 NOTE — OP NOTE
OPERATIVE REPORT  PATIENT NAME: Delores Fernandez    :  1990  MRN: [id number]  Pt Location: [facility] OR ROOM 04    SURGERY DATE: 2023    Surgeon(s) and Role:     Louanne Camera Toussaint-Foster, DO - Primary     * Alea Mendez MD - Assisting    Preop Diagnosis:  Uterine leiomyoma, unspecified location [D25.9]  Abnormal uterine bleeding (AUB) [N93.9]  Pelvic pain [R10.2]    Post-Op Diagnosis Codes:     * Uterine leiomyoma, unspecified location [D25.9]     * Abnormal uterine bleeding (AUB) [N93.9]     * Pelvic pain [R10.2]    Procedure(s) (LRB):  HYSTEROSCOPY W/  RESECTION UTERINE TUMOR/FIBROID (N/A)    Specimen(s):  ID Type Source Tests Collected by Time Destination   1 : [facility] Tissue Endometrium TISSUE EXAM Nella Green DO 2023 1025        Surgical QBL:  5cc    Drains:  None    Anesthesia Type:   General    Operative Indications:  Uterine leiomyoma, unspecified location [D25.9]  Abnormal uterine bleeding (AUB) [N93.9]  Pelvic pain [R10.2]     Operative Findings:  Normal external female genitalia  Normal size, anteverted uterus  Normal appearing parous cervix  Normal vagina  Proliferative endometrium without polyps or discrete masses    Complications:   None    Procedure and Technique:  Patient was taken to the operating room. General LMA anesthesia (LMA) was administered and the patient was positioned on the OR table in the dorsal lithotomy position. All pressure points were padded and a desmond hugger was placed to maintain control of core body temperature. A bimanual exam was performed and the uterus was noted to be anteverted, normal in size and consistency with no palpable adnexal masses or fullness. The patient was prepped and draped in the usual sterile fashion. A time out was performed to confirm correct patient and correct procedure. A straight catheter was introduced into the bladder, which was drained of 100cc of clear yellow urine.  A bivalved speculum was inserted into the vagina to visualize the anterior lip of the cervix, which was then grasped with a single toothed tenaculum. The uterus was sounded to 8cm. The cervix was serially dilated to 12 [place] for introduction of the hysteroscope. Hysteroscope was introduced under direct visualization using normal saline solution as the distention media. Hysteroscope was advanced to the uterine fundus and the entire uterine cavity was inspected in a systematic manner. There was noted to be proliferative endometrium. Hysteroscope was withdrawn and sharp curetting was performed, starting at the 12'oclock position and rotating a total of 360 degrees to cover all surfaces. Endometrial tissue was obtained and sent for pathology. The single toothed tenaculum was removed from the anterior lip of the cervix. Good hemostasis was confirmed at the tenaculum puncture sites. Weighted speculum was then removed from the vagina. At the conclusion of the procedure, all needle, sponge, and instrument counts were noted to be correct x2. Dr. Madisyn Makcey was present and participated in all key portions of the case.     Patient Disposition:  PACU         SIGNATURE: Mo Diaz MD  DATE: December 1, 2023  TIME: 10:30 AM

## 2023-12-01 NOTE — ANESTHESIA POSTPROCEDURE EVALUATION
Post-Op Assessment Note    CV Status:  Stable    Pain management: adequate       Mental Status:  Alert and awake   Hydration Status:  Euvolemic   PONV Controlled:  Controlled   Airway Patency:  Patent     Post Op Vitals Reviewed: Yes    No anethesia notable event occurred.     Staff: Anesthesiologist               BP      Temp      Pulse     Resp      SpO2      /62   Pulse 70   Temp 97.7 °F (36.5 °C)   Resp 17   Wt 62.6 kg (138 lb 0.1 oz)   LMP 11/23/2023   SpO2 98%   BMI 27.87 kg/m²

## 2023-12-01 NOTE — ANESTHESIA PREPROCEDURE EVALUATION
Procedure:  HYSTEROSCOPY W/  RESECTION UTERINE TUMOR/FIBROID (Uterus)    Relevant Problems   ANESTHESIA (within normal limits)      CARDIO (within normal limits)      ENDO (within normal limits)      GI/HEPATIC (within normal limits)      /RENAL (within normal limits)      GYN (within normal limits)      HEMATOLOGY (within normal limits)      MUSCULOSKELETAL (within normal limits)      NEURO/PSYCH (within normal limits)      PULMONARY (within normal limits)        Physical Exam    Airway    Mallampati score: II  TM Distance: >3 FB  Neck ROM: full     Dental   No notable dental hx     Cardiovascular  Rhythm: regular, Rate: normal, Cardiovascular exam normal    Pulmonary  Pulmonary exam normal Breath sounds clear to auscultation    Other Findings  post-pubertal.      Anesthesia Plan  ASA Score- 1     Anesthesia Type- general with ASA Monitors. Additional Monitors:     Airway Plan: LMA. Plan Factors-Exercise tolerance (METS): >4 METS. Chart reviewed. Existing labs reviewed. Patient summary reviewed. Patient is not a current smoker. Induction- intravenous. Postoperative Plan-     Informed Consent- Anesthetic plan and risks discussed with patient.

## 2023-12-01 NOTE — INTERVAL H&P NOTE
H&P reviewed. After examining the patient I find no changes in the patients condition since the H&P had been written.     Vitals:    12/01/23 0815   BP: 114/69   Pulse: 93   Resp: 16   Temp: 97.7 °F (36.5 °C)   SpO2: 96%

## 2023-12-06 PROCEDURE — 88305 TISSUE EXAM BY PATHOLOGIST: CPT | Performed by: PATHOLOGY

## 2023-12-14 ENCOUNTER — OFFICE VISIT (OUTPATIENT)
Dept: OBGYN CLINIC | Facility: CLINIC | Age: 33
End: 2023-12-14

## 2023-12-14 VITALS
HEART RATE: 89 BPM | DIASTOLIC BLOOD PRESSURE: 70 MMHG | SYSTOLIC BLOOD PRESSURE: 106 MMHG | WEIGHT: 137.6 LBS | BODY MASS INDEX: 27.74 KG/M2 | HEIGHT: 59 IN

## 2023-12-14 DIAGNOSIS — R10.9 ACUTE RIGHT FLANK PAIN: ICD-10-CM

## 2023-12-14 DIAGNOSIS — Z09 POSTOP CHECK: Primary | ICD-10-CM

## 2023-12-14 DIAGNOSIS — R10.2 PELVIC PAIN: ICD-10-CM

## 2023-12-14 LAB
SL AMB  POCT GLUCOSE, UA: NEGATIVE
SL AMB LEUKOCYTE ESTERASE,UA: NEGATIVE
SL AMB POCT BILIRUBIN,UA: NEGATIVE
SL AMB POCT BLOOD,UA: NEGATIVE
SL AMB POCT CLARITY,UA: ABNORMAL
SL AMB POCT COLOR,UA: YELLOW
SL AMB POCT KETONES,UA: NEGATIVE
SL AMB POCT NITRITE,UA: NEGATIVE
SL AMB POCT PH,UA: 5
SL AMB POCT SPECIFIC GRAVITY,UA: 1.03
SL AMB POCT URINE PROTEIN: ABNORMAL
SL AMB POCT UROBILINOGEN: NEGATIVE

## 2023-12-14 PROCEDURE — 81002 URINALYSIS NONAUTO W/O SCOPE: CPT | Performed by: OBSTETRICS & GYNECOLOGY

## 2023-12-14 PROCEDURE — 87086 URINE CULTURE/COLONY COUNT: CPT | Performed by: OBSTETRICS & GYNECOLOGY

## 2023-12-14 PROCEDURE — 99213 OFFICE O/P EST LOW 20 MIN: CPT | Performed by: OBSTETRICS & GYNECOLOGY

## 2023-12-14 NOTE — LETTER
Suzanne Mccartney Debbie  1990           To whom it may concern,      Suzanne MalloryTwan is under my care.  Suzanne was operated on 12/1/23 and she may return work unrestricted.  Please contact our office with any questions or concerns.        Sincerely,     Yardlie Toussaint-Foster, DO  12/14/2023

## 2023-12-15 LAB — BACTERIA UR CULT: NORMAL

## 2023-12-18 NOTE — PATIENT INSTRUCTIONS
Donavan por cervantes confianza en nuestro equipo.   Le agradecemos y agradecemos campos comentarios.   Si recibe yaz encuesta nuestra, tómese unos momentos para informarnos cómo estamos.   Sinceramente,  Yarosanae Toussaint-Foster, DO

## 2023-12-18 NOTE — PROGRESS NOTES
"PROBLEM GYNECOLOGICAL VISIT    Suzanne Lewis is a 33 y.o. female who presents today for follow up.  Her general medical history has been reviewed and she reports it as follows:    Past Medical History:   Diagnosis Date    Wears contact lenses      Past Surgical History:   Procedure Laterality Date    KNEE SURGERY Left     [place] HYSTEROSCOPY BX ENDOMETRIUM&/POLYPC W/WO D&C N/A 12/1/2023    Procedure: HYSTEROSCOPY W/  RESECTION UTERINE TUMOR/FIBROID;  Surgeon: Yardlie Toussaint-Foster, DO;  Location: [facility];  Service: Gynecology     OB History    No obstetric history on file.       Social History     Tobacco Use    Smoking status: Never    Smokeless tobacco: Never   Vaping Use    Vaping status: Never Used   Substance Use Topics    Alcohol use: Never    Drug use: Never     Social History     Substance and Sexual Activity   Sexual Activity Not Currently       Current Outpatient Medications   Medication Instructions    ibuprofen (MOTRIN) 400 mg, Oral, Every 8 hours PRN    miSOPROStol (Cytotec) 200 mcg tablet [place] yaz tableta por via oral la noche anterior al precedimiento luego tome un inserto de tableta por via intravaginal la manana del procedimiento a las 5:30am       History of Present Illness:   Patient presents for follow up s/p D&C hysteroscopy with c/o pelvic pain.    Review of Systems:  Review of Systems   All other systems reviewed and are negative.      Physical Exam:  /70   Pulse 89   Ht 4' 11\" (1.499 m)   Wt 62.4 kg (137 lb 9.6 oz)   LMP 11/23/2023 (Approximate)   BMI 27.79 kg/m²   Physical Exam  Constitutional:       Appearance: Normal appearance.   Genitourinary:      Urethral meatus normal.      No lesions in the vagina.      Right Labia: No rash, tenderness or lesions.     Left Labia: No tenderness, lesions or rash.     No vaginal discharge or bleeding.      No urethral tenderness or mass present.      Bladder is tender.   Neurological:      Mental Status: She is alert. "   Vitals reviewed.       Discussion:  Discuss with patient after examination that the area of her pain is the bladder recommend a pelvic sonogram and xray to r/o any kidney stones.      Assessment:   1. Pelvic pain    Plan:   1. Cultures ordered: urine   2. Imaging ordered: pelvic sonogram and xray   3. Return to office prn will call patient with results.    Reviewed with patient that test results are available in [facility] immediately, but that they will not necessarily be reviewed by me immediately.  Explained that I will review results at my earliest opportunity and contact patient appropriately.

## 2023-12-19 ENCOUNTER — HOSPITAL ENCOUNTER (OUTPATIENT)
Dept: ULTRASOUND IMAGING | Facility: HOSPITAL | Age: 33
Discharge: HOME | End: 2023-12-19

## 2023-12-19 PROCEDURE — 76856 US EXAM PELVIC COMPLETE: CPT

## 2023-12-19 PROCEDURE — 76830 TRANSVAGINAL US NON-OB: CPT

## 2024-04-15 ENCOUNTER — HOSPITAL ENCOUNTER (EMERGENCY)
Facility: HOSPITAL | Age: 34
Discharge: HOME | End: 2024-04-15
Attending: EMERGENCY MEDICINE

## 2024-04-15 VITALS
DIASTOLIC BLOOD PRESSURE: 58 MMHG | SYSTOLIC BLOOD PRESSURE: 109 MMHG | OXYGEN SATURATION: 99 % | TEMPERATURE: 98 F | HEART RATE: 81 BPM

## 2024-04-15 DIAGNOSIS — L02.91 ABSCESS: Primary | ICD-10-CM

## 2024-04-15 PROCEDURE — 99282 EMERGENCY DEPT VISIT SF MDM: CPT

## 2024-04-15 RX ORDER — OXYCODONE HYDROCHLORIDE 5 MG/1
5 TABLET ORAL EVERY 6 HOURS PRN
Qty: 12 TABLET | Refills: 0 | Status: SHIPPED | OUTPATIENT
Start: 2024-04-15 | End: 2024-04-25

## 2024-04-15 RX ORDER — OXYCODONE HYDROCHLORIDE 5 MG/1
5 TABLET ORAL ONCE
Status: COMPLETED | OUTPATIENT
Start: 2024-04-15 | End: 2024-04-15

## 2024-04-15 RX ORDER — DOXYCYCLINE HYCLATE 100 MG/1
100 CAPSULE ORAL ONCE
Status: COMPLETED | OUTPATIENT
Start: 2024-04-15 | End: 2024-04-15

## 2024-04-15 RX ORDER — DOXYCYCLINE HYCLATE 100 MG/1
100 CAPSULE ORAL 2 TIMES DAILY
Qty: 14 CAPSULE | Refills: 0 | Status: SHIPPED | OUTPATIENT
Start: 2024-04-15 | End: 2024-04-22

## 2024-04-15 RX ADMIN — OXYCODONE 5 MG: 5 TABLET ORAL at 23:04

## 2024-04-15 RX ADMIN — DOXYCYCLINE 100 MG: 100 CAPSULE ORAL at 23:03

## 2024-04-16 NOTE — ED ATTENDING ATTESTATION
4/15/2024  I, Kyle Donato MD, saw and evaluated the patient. I have discussed the patient with the resident/non-physician practitioner and agree with the resident's/non-physician practitioner's findings, Plan of Care, and MDM as documented in the resident's/non-physician practitioner's note, except where noted. All available labs and Radiology studies were reviewed.  I was present for key portions of any procedure(s) performed by the resident/non-physician practitioner and I was immediately available to provide assistance.       At this point I agree with the current assessment done in the Emergency Department.  I have conducted an independent evaluation of this patient a history and physical is as follows:    33-year-old female, presents with swelling and pain in the left thigh.  On exam, patient has open area with no current drainage, surrounding mild erythema and induration.    ED Course     Bedside ultrasound shows no large fluid collection.  Will place patient on oral antibiotics and pain medication, instructed to apply warm compresses, return to emergency department if getting worse.    Critical Care Time  Procedures

## 2024-04-16 NOTE — ED PROVIDER NOTES
History  Chief Complaint   Patient presents with    Abscess     Pt with abscess L thgh.     HPI  Patient is a 33-year-old female no seen past medical history presenting for left thigh abscess.  Patient states has been present for 1 week.  Patient denies any systemic symptoms.  Patient states has been draining but presents to the ER due to continued pain.  Patient has not started antibiotics.  Prior to Admission Medications   Prescriptions Last Dose Informant Patient Reported? Taking?   ibuprofen (MOTRIN) 400 mg tablet   No No   Sig: Take 1 tablet (400 mg total) by mouth every 8 (eight) hours as needed for mild pain or moderate pain   miSOPROStol (Cytotec) 200 mcg tablet   No No   Sig: [place] yaz tableta por via oral la noche anterior al precedimiento luego tome un inserto de tableta por via intravaginal la manana del procedimiento a las 5:30am   Patient not taking: Reported on 12/14/2023      Facility-Administered Medications: None       Past Medical History:   Diagnosis Date    Wears contact lenses        Past Surgical History:   Procedure Laterality Date    KNEE SURGERY Left     [place] HYSTEROSCOPY BX ENDOMETRIUM&/POLYPC W/WO D&C N/A 12/1/2023    Procedure: HYSTEROSCOPY W/  RESECTION UTERINE TUMOR/FIBROID;  Surgeon: Yardlie Toussaint-Foster, DO;  Location: [facility];  Service: Gynecology       No family history on file.  I have reviewed and agree with the history as documented.    E-Cigarette/Vaping    E-Cigarette Use Never User      E-Cigarette/Vaping Substances    Nicotine No     THC No     CBD No     Flavoring No     Other No     Unknown No      Social History     Tobacco Use    Smoking status: Never    Smokeless tobacco: Never   Vaping Use    Vaping status: Never Used   Substance Use Topics    Alcohol use: Never    Drug use: Never        Review of Systems   Constitutional: Negative.  Negative for chills and fever.   HENT: Negative.     Eyes: Negative.    Respiratory: Negative.     Cardiovascular: Negative.     Gastrointestinal: Negative.    Endocrine: Negative.    Genitourinary: Negative.    Musculoskeletal: Negative.    Skin:         Left thigh abscess.   Allergic/Immunologic: Negative.    Neurological: Negative.    Hematological: Negative.    Psychiatric/Behavioral: Negative.     All other systems reviewed and are negative.      Physical Exam  ED Triage Vitals [04/15/24 2238]   Temperature Pulse Resp Blood Pressure SpO2   98 °F (36.7 °C) 81 -- 109/58 99 %      Temp Source Heart Rate Source Patient Position - Orthostatic VS BP Location FiO2 (%)   Oral -- -- -- --      Pain Score       10 - Worst Possible Pain             Orthostatic Vital Signs  Vitals:    04/15/24 2238   BP: 109/58   Pulse: 81       Physical Exam  Vitals and nursing note reviewed.   Constitutional:       Appearance: Normal appearance. She is normal weight.   HENT:      Head: Normocephalic and atraumatic.      Right Ear: Tympanic membrane, ear canal and external ear normal.      Left Ear: Tympanic membrane, ear canal and external ear normal.      Nose: Nose normal.      Mouth/Throat:      Mouth: Mucous membranes are moist.      Pharynx: Oropharynx is clear.   Eyes:      Extraocular Movements: Extraocular movements intact.      Conjunctiva/sclera: Conjunctivae normal.      Pupils: Pupils are equal, round, and reactive to light.   Cardiovascular:      Rate and Rhythm: Normal rate and regular rhythm.      Pulses: Normal pulses.      Heart sounds: Normal heart sounds.   Pulmonary:      Effort: Pulmonary effort is normal.      Breath sounds: Normal breath sounds.   Abdominal:      General: Abdomen is flat. Bowel sounds are normal.      Palpations: Abdomen is soft.   Musculoskeletal:         General: Normal range of motion.      Cervical back: Normal range of motion and neck supple.   Skin:     General: Skin is warm and dry.      Capillary Refill: Capillary refill takes less than 2 seconds.      Comments: Patient has an abscess on her left lateral thigh.   Area is indurated and tender to palpation, purulent drainage noted from drainage tract.  See picture below for image.   Neurological:      General: No focal deficit present.      Mental Status: She is alert and oriented to person, place, and time.   Psychiatric:         Mood and Affect: Mood normal.         Behavior: Behavior normal.         Thought Content: Thought content normal.         Judgment: Judgment normal.         ED Medications  Medications   doxycycline hyclate (VIBRAMYCIN) capsule 100 mg (100 mg Oral Given 4/15/24 2303)   oxyCODONE (ROXICODONE) IR tablet 5 mg (5 mg Oral Given 4/15/24 2304)       Diagnostic Studies  Results Reviewed       None                   No orders to display         Procedures  Procedures      ED Course                             SBIRT 20yo+      Flowsheet Row Most Recent Value   Initial Alcohol Screen: US AUDIT-C     1. How often do you have a drink containing alcohol? 0 Filed at: 04/15/2024 2250   2. How many drinks containing alcohol do you have on a typical day you are drinking?  0 Filed at: 04/15/2024 2250   3b. FEMALE Any Age, or MALE 65+: How often do you have 4 or more drinks on one occassion? 0 Filed at: 04/15/2024 2250   Audit-C Score 0 Filed at: 04/15/2024 2250   DARYL: How many times in the past year have you...    Used an illegal drug or used a prescription medication for non-medical reasons? Never Filed at: 04/15/2024 2250                  Medical Decision Making  Patient is 33-year-old female presenting for concerns of left lateral thigh abscess.  DDx: Abscess  Based on patient mentation physical exam findings, primary concern is for thigh abscess.  Given patient already has drainage tract, no plans for incision and drainage.  Will plan to treat with doxycycline and pain medications for home.  First dose of Doxy and pain meds given here.  Prescription sent to pharmacy.  Return precautions given.  Patient understands and agrees.  Ready for discharge.    Problems  Addressed:  Abscess: acute illness or injury    Risk  Prescription drug management.          Disposition  Final diagnoses:   Abscess     Time reflects when diagnosis was documented in both MDM as applicable and the Disposition within this note       Time User Action Codes Description Comment    4/15/2024 10:57 PM Justice Baird [L02.91] Abscess           ED Disposition       ED Disposition   Discharge    Condition   Stable    Date/Time   Mon Apr 15, 2024 8248    Comment   Suzanne MalloryTwan discharge to home/self care.                   Follow-up Information       Follow up With Specialties Details Why Contact Info Additional Information    [facility] Emergency Department Emergency Medicine Go to  If symptoms worsen [address] [place] [id number]  [phone] [facility] Emergency Department, [address] [place] [address], [phone]            Patient's Medications   Discharge Prescriptions    DOXYCYCLINE HYCLATE (VIBRAMYCIN) 100 MG CAPSULE    Take 1 capsule (100 mg total) by mouth 2 (two) times a day for 7 days       Start Date: 4/15/2024 End Date: 4/22/2024       Order Dose: 100 mg       Quantity: 14 capsule    Refills: 0    OXYCODONE (ROXICODONE) 5 IMMEDIATE RELEASE TABLET    Take 1 tablet (5 mg total) by mouth every 6 (six) hours as needed for moderate pain for up to 10 days Max Daily Amount: 20 mg       Start Date: 4/15/2024 End Date: 4/25/2024       Order Dose: 5 mg       Quantity: 12 tablet    Refills: 0     No discharge procedures on file.    PDMP Review       None             ED Provider  Attending physically available and evaluated Suzanne Sherrill Lewis. I managed the patient along with the ED Attending.    Electronically Signed by           Justice Baird MD  04/15/24 9143

## 2024-04-16 NOTE — DISCHARGE INSTRUCTIONS
Please take antibiotic as prescribed for treatment of your abscess and take oxycodone as prescribed for pain.

## 2024-05-25 ENCOUNTER — HOSPITAL ENCOUNTER (EMERGENCY)
Facility: HOSPITAL | Age: 34
Discharge: HOME | End: 2024-05-25
Attending: EMERGENCY MEDICINE

## 2024-05-25 VITALS
WEIGHT: 137.57 LBS | SYSTOLIC BLOOD PRESSURE: 125 MMHG | RESPIRATION RATE: 16 BRPM | OXYGEN SATURATION: 98 % | DIASTOLIC BLOOD PRESSURE: 85 MMHG | HEART RATE: 88 BPM | BODY MASS INDEX: 27.79 KG/M2 | TEMPERATURE: 98.2 F

## 2024-05-25 DIAGNOSIS — H60.93 BILATERAL OTITIS EXTERNA: Primary | ICD-10-CM

## 2024-05-25 DIAGNOSIS — J02.9 PHARYNGITIS: ICD-10-CM

## 2024-05-25 LAB — S PYO DNA THROAT QL NAA+PROBE: NOT DETECTED

## 2024-05-25 PROCEDURE — 87651 STREP A DNA AMP PROBE: CPT

## 2024-05-25 PROCEDURE — 99283 EMERGENCY DEPT VISIT LOW MDM: CPT

## 2024-05-25 PROCEDURE — 99284 EMERGENCY DEPT VISIT MOD MDM: CPT

## 2024-05-25 RX ORDER — OFLOXACIN 3 MG/ML
10 SOLUTION AURICULAR (OTIC) DAILY
Qty: 5 ML | Refills: 0 | Status: SHIPPED | OUTPATIENT
Start: 2024-05-25 | End: 2024-06-01

## 2024-05-25 RX ORDER — IBUPROFEN 600 MG/1
600 TABLET ORAL EVERY 6 HOURS PRN
Qty: 30 TABLET | Refills: 0 | Status: SHIPPED | OUTPATIENT
Start: 2024-05-25

## 2024-05-25 NOTE — Clinical Note
Suzanne Lewis was seen and treated in our emergency department on 5/25/2024.                Diagnosis:     Suzanne  may return to work on return date.    She may return on this date: 05/26/2024         If you have any questions or concerns, please don't hesitate to call.      Krupa Rahman PA-C    ______________________________           _______________          _______________  Hospital Representative                              Date                                Time

## 2024-05-26 NOTE — ED PROVIDER NOTES
History  Chief Complaint   Patient presents with    Earache     Reports b/l ear pain and throat pain x1 week.     Patient is a 33-year-old female without past medical history senting with bilateral ear pain and sore throat for 1 week.  No fevers or chills.  No other URI symptoms.  No medications prior to arrival.      Earache  Associated symptoms: sore throat    Associated symptoms: no abdominal pain, no congestion, no cough, no fever, no rash and no vomiting        Prior to Admission Medications   Prescriptions Last Dose Informant Patient Reported? Taking?   ibuprofen (MOTRIN) 400 mg tablet   No No   Sig: Take 1 tablet (400 mg total) by mouth every 8 (eight) hours as needed for mild pain or moderate pain   miSOPROStol (Cytotec) 200 mcg tablet   No No   Sig: [place] yaz tableta por via oral la noche anterior al precedimiento luego tome un inserto de tableta por via intravaginal la manana del procedimiento a las 5:30am   Patient not taking: Reported on 12/14/2023      Facility-Administered Medications: None       Past Medical History:   Diagnosis Date    Wears contact lenses        Past Surgical History:   Procedure Laterality Date    KNEE SURGERY Left     [place] HYSTEROSCOPY BX ENDOMETRIUM&/POLYPC W/WO D&C N/A 12/1/2023    Procedure: HYSTEROSCOPY W/  RESECTION UTERINE TUMOR/FIBROID;  Surgeon: Yardlie Toussaint-Foster, DO;  Location: [facility];  Service: Gynecology       History reviewed. No pertinent family history.  I have reviewed and agree with the history as documented.    E-Cigarette/Vaping    E-Cigarette Use Never User      E-Cigarette/Vaping Substances    Nicotine No     THC No     CBD No     Flavoring No     Other No     Unknown No      Social History     Tobacco Use    Smoking status: Never    Smokeless tobacco: Never   Vaping Use    Vaping status: Never Used   Substance Use Topics    Alcohol use: Never    Drug use: Never       Review of Systems   Constitutional:  Negative for chills and fever.   HENT:  Positive  for ear pain and sore throat. Negative for congestion.    Eyes:  Negative for pain and visual disturbance.   Respiratory:  Negative for cough and shortness of breath.    Cardiovascular:  Negative for chest pain and palpitations.   Gastrointestinal:  Negative for abdominal pain and vomiting.   Genitourinary:  Negative for dysuria and hematuria.   Musculoskeletal:  Negative for arthralgias and back pain.   Skin:  Negative for color change and rash.   Neurological:  Negative for seizures and syncope.   All other systems reviewed and are negative.      Physical Exam  Physical Exam  Vitals and nursing note reviewed.   Constitutional:       General: She is not in acute distress.     Appearance: Normal appearance. She is not toxic-appearing.   HENT:      Head: Normocephalic and atraumatic.      Right Ear: External ear normal. Drainage (White drainage in ear canal.) present. No mastoid tenderness. Tympanic membrane is not perforated, erythematous or bulging.      Left Ear: External ear normal. Swelling (Significant swelling of ear canal.) present. No drainage. No mastoid tenderness. Tympanic membrane is not perforated, erythematous or bulging.      Ears:      Comments: Pain with pulling of pinna bilaterally.     Nose: Nose normal.      Mouth/Throat:      Mouth: Mucous membranes are moist.      Pharynx: Posterior oropharyngeal erythema present. No oropharyngeal exudate.   Eyes:      General: No scleral icterus.        Right eye: No discharge.         Left eye: No discharge.      Extraocular Movements: Extraocular movements intact.      Conjunctiva/sclera: Conjunctivae normal.   Cardiovascular:      Rate and Rhythm: Normal rate and regular rhythm.      Pulses: Normal pulses.      Heart sounds: Normal heart sounds.   Pulmonary:      Effort: Pulmonary effort is normal. No respiratory distress.      Breath sounds: Normal breath sounds.   Abdominal:      Palpations: Abdomen is soft.      Tenderness: There is no abdominal  tenderness.   Musculoskeletal:         General: No tenderness, deformity or signs of injury.      Cervical back: Normal range of motion and neck supple.   Skin:     General: Skin is dry.      Coloration: Skin is not jaundiced.      Findings: No erythema or rash.   Neurological:      General: No focal deficit present.      Mental Status: She is alert and oriented to person, place, and time. Mental status is at baseline.      Motor: No weakness.      Gait: Gait normal.   Psychiatric:         Mood and Affect: Mood normal.         Behavior: Behavior normal.         Thought Content: Thought content normal.         Vital Signs  ED Triage Vitals [05/25/24 2115]   Temperature Pulse Respirations Blood Pressure SpO2   98.2 °F (36.8 °C) 88 16 125/85 98 %      Temp Source Heart Rate Source Patient Position - Orthostatic VS BP Location FiO2 (%)   Oral Monitor Sitting Right arm --      Pain Score       10 - Worst Possible Pain           Vitals:    05/25/24 2115   BP: 125/85   Pulse: 88   Patient Position - Orthostatic VS: Sitting         Visual Acuity      ED Medications  Medications - No data to display    Diagnostic Studies  Results Reviewed       Procedure Component Value Units Date/Time    Strep A PCR [[id number]]  (Normal) Collected: 05/25/24 2145    Lab Status: Final result Specimen: Throat Updated: 05/25/24 2213     STREP A PCR Not Detected                   No orders to display              Procedures  Procedures         ED Course                               SBIRT 20yo+      Flowsheet Row Most Recent Value   Initial Alcohol Screen: US AUDIT-C     1. How often do you have a drink containing alcohol? 0 Filed at: 05/25/2024 2124   2. How many drinks containing alcohol do you have on a typical day you are drinking?  0 Filed at: 05/25/2024 2124   3b. FEMALE Any Age, or MALE 65+: How often do you have 4 or more drinks on one occassion? 0 Filed at: 05/25/2024 2124   Audit-C Score 0 Filed at: 05/25/2024 2124   DARYL: How many  times in the past year have you...    Used an illegal drug or used a prescription medication for non-medical reasons? Never Filed at: 05/25/2024 2124                      Medical Decision Making  Patient is a 33-year-old who presenting with bilateral ear pain and sore throat for 1 week.  Vitals are stable on arrival and she is in no acute distress.  DDx: Strep pharyngitis, otitis media, otitis externa, TMJ.  No trismus, uvular deviation, unilateral tonsillar swelling concerning for PTA or mastoid tenderness/erythema concerning for mastoiditis-no labs or CT indicated.  Exam most consistent with bilateral otitis externa.  Will treat with ofloxacin drops and Motrin.  Advised to follow-up with PCP if symptoms persist.    I have discussed findings and plan for discharge with the patient/caregiver. Follow up with the appropriate providers including primary care physician was discussed. Return precautions discussed with patient/caregiver as outlined in AVS. Patient/caregiver verbally expressed understanding. Patient stable at time of discharge and ambulated out of the emergency department.       Amount and/or Complexity of Data Reviewed  Labs: ordered.    Risk  Prescription drug management.             Disposition  Final diagnoses:   Bilateral otitis externa   Pharyngitis     Time reflects when diagnosis was documented in both MDM as applicable and the Disposition within this note       Time User Action Codes Description Comment    5/25/2024  9:46 PM Krupa Rahman Add [H60.93] Bilateral otitis externa     5/25/2024  9:46 PM Krupa Rahman Add [J02.9] Pharyngitis           ED Disposition       ED Disposition   Discharge    Condition   Stable    Date/Time   Sat May 25, 2024 2146    Comment   Suzanne Lewis discharge to home/self care.                   Follow-up Information       Follow up With Specialties Details Why Contact [facility]   [address]  [address]  [place] [address]  [phone]              Discharge Medication List as of 5/25/2024  9:50 PM        START taking these medications    Details   !! ibuprofen (MOTRIN) 600 mg tablet Take 1 tablet (600 mg total) by mouth every 6 (six) hours as needed for mild pain, Starting Sat 5/25/2024, Normal      ofloxacin (FLOXIN) 0.3 % otic solution Administer 10 drops into ears daily for 7 days, Starting Sat 5/25/2024, Until Sat 6/1/2024, Normal       !! - Potential duplicate medications found. Please discuss with provider.        CONTINUE these medications which have NOT CHANGED    Details   !! ibuprofen (MOTRIN) 400 mg tablet Take 1 tablet (400 mg total) by mouth every 8 (eight) hours as needed for mild pain or moderate pain, Starting Wed 10/4/2023, Normal      miSOPROStol (Cytotec) 200 mcg tablet [place] yaz tableta por via oral la noche anterior al precedimiento luego tome un inserto de tableta por via intravaginal la manana del procedimiento a las 5:30am, Normal       !! - Potential duplicate medications found. Please discuss with provider.          No discharge procedures on file.    PDMP Review       None            ED Provider  Electronically Signed by             Krupa Rahman PA-C  05/26/24 2464

## 2024-05-26 NOTE — DISCHARGE INSTRUCTIONS
-Llamará con resultados positivos en las pruebas.  Si el estreptococo es positivo, se enviarán antibióticos en florecita momento.    -Use Motrin y Tylenol según sea necesario para el dolor de oído.  Use gotas antibióticas según lo recetado para la infección del oído externo.    -Will call with positive test results.  If strep is positive, will send antibiotics at that time.    -Use Motrin and Tylenol as needed for ear pain.  Use antibiotic drops as prescribed for outer ear infection.

## 2024-12-05 ENCOUNTER — HOSPITAL ENCOUNTER (EMERGENCY)
Facility: HOSPITAL | Age: 34
Discharge: HOME | End: 2024-12-05
Attending: INTERNAL MEDICINE

## 2024-12-05 VITALS
RESPIRATION RATE: 18 BRPM | OXYGEN SATURATION: 98 % | BODY MASS INDEX: 27.92 KG/M2 | WEIGHT: 138.23 LBS | HEART RATE: 107 BPM | SYSTOLIC BLOOD PRESSURE: 113 MMHG | TEMPERATURE: 98.3 F | DIASTOLIC BLOOD PRESSURE: 66 MMHG

## 2024-12-05 DIAGNOSIS — R68.89 FLU-LIKE SYMPTOMS: Primary | ICD-10-CM

## 2024-12-05 LAB
EXT PREGNANCY TEST URINE: NEGATIVE
FLUAV AG UPPER RESP QL IA.RAPID: POSITIVE
FLUBV AG UPPER RESP QL IA.RAPID: NEGATIVE
SARS-COV+SARS-COV-2 AG RESP QL IA.RAPID: NEGATIVE

## 2024-12-05 PROCEDURE — 99283 EMERGENCY DEPT VISIT LOW MDM: CPT

## 2024-12-05 PROCEDURE — 87811 SARS-COV-2 COVID19 W/OPTIC: CPT | Performed by: INTERNAL MEDICINE

## 2024-12-05 PROCEDURE — 96372 THER/PROPH/DIAG INJ SC/IM: CPT

## 2024-12-05 PROCEDURE — 99284 EMERGENCY DEPT VISIT MOD MDM: CPT | Performed by: INTERNAL MEDICINE

## 2024-12-05 PROCEDURE — 81025 URINE PREGNANCY TEST: CPT | Performed by: INTERNAL MEDICINE

## 2024-12-05 PROCEDURE — 87804 INFLUENZA ASSAY W/OPTIC: CPT | Performed by: INTERNAL MEDICINE

## 2024-12-05 RX ORDER — NAPROXEN 500 MG/1
500 TABLET ORAL 2 TIMES DAILY WITH MEALS
Qty: 30 TABLET | Refills: 0 | Status: SHIPPED | OUTPATIENT
Start: 2024-12-05

## 2024-12-05 RX ORDER — BENZONATATE 100 MG/1
100 CAPSULE ORAL EVERY 8 HOURS
Qty: 21 CAPSULE | Refills: 0 | Status: SHIPPED | OUTPATIENT
Start: 2024-12-05

## 2024-12-05 RX ORDER — ONDANSETRON 4 MG/1
4 TABLET, ORALLY DISINTEGRATING ORAL EVERY 6 HOURS PRN
Qty: 20 TABLET | Refills: 0 | Status: SHIPPED | OUTPATIENT
Start: 2024-12-05

## 2024-12-05 RX ORDER — SENNOSIDES 8.6 MG
650 CAPSULE ORAL EVERY 8 HOURS PRN
Qty: 30 TABLET | Refills: 0 | Status: SHIPPED | OUTPATIENT
Start: 2024-12-05

## 2024-12-05 RX ORDER — FLUTICASONE PROPIONATE 50 MCG
1 SPRAY, SUSPENSION (ML) NASAL DAILY
Qty: 16 G | Refills: 0 | Status: SHIPPED | OUTPATIENT
Start: 2024-12-05

## 2024-12-05 RX ORDER — ACETAMINOPHEN 325 MG/1
975 TABLET ORAL ONCE
Status: COMPLETED | OUTPATIENT
Start: 2024-12-05 | End: 2024-12-05

## 2024-12-05 RX ORDER — KETOROLAC TROMETHAMINE 30 MG/ML
30 INJECTION, SOLUTION INTRAMUSCULAR; INTRAVENOUS ONCE
Status: COMPLETED | OUTPATIENT
Start: 2024-12-05 | End: 2024-12-05

## 2024-12-05 RX ORDER — ONDANSETRON 4 MG/1
4 TABLET, ORALLY DISINTEGRATING ORAL ONCE
Status: COMPLETED | OUTPATIENT
Start: 2024-12-05 | End: 2024-12-05

## 2024-12-05 RX ADMIN — KETOROLAC TROMETHAMINE 30 MG: 30 INJECTION, SOLUTION INTRAMUSCULAR; INTRAVENOUS at 07:25

## 2024-12-05 RX ADMIN — ACETAMINOPHEN 975 MG: 325 TABLET, FILM COATED ORAL at 07:27

## 2024-12-05 RX ADMIN — ONDANSETRON 4 MG: 4 TABLET, ORALLY DISINTEGRATING ORAL at 07:28

## 2024-12-05 NOTE — Clinical Note
Suzanne Lewis was seen and treated in our emergency department on 12/5/2024.    No restrictions            Diagnosis:     Suzanne  .    She may return on this date:          If you have any questions or concerns, please don't hesitate to call.      Jocelyn Jones MD    ______________________________           _______________          _______________  Hospital Representative                              Date                                Time

## 2024-12-05 NOTE — Clinical Note
Ken Mann accompanied Suzanne Lewis to the emergency department on 12/5/2024.    Return date if applicable: 12/06/2024        If you have any questions or concerns, please don't hesitate to call.      Cecy Ferrara RN

## 2024-12-05 NOTE — ED PROVIDER NOTES
Chief Complaint   Patient presents with    Flu Symptoms     Pt reports fever, cough and bodyaches for a week. Tylenol pta. +n/v     HPI: Patient is a 34 y.o. female who presents with 3-4 days of fever, cough, headache, fatigue, myalgias, diarrhea, nausea, and vomiting which the patient describes at mild The patient has not had contact with people with similar symptoms.  The patient has not taken any medication today.    No Known Allergies    Past Medical History:   Diagnosis Date    Wears contact lenses       Past Surgical History:   Procedure Laterality Date    KNEE SURGERY Left     [place] HYSTEROSCOPY BX ENDOMETRIUM&/POLYPC W/WO D&C N/A 12/1/2023    Procedure: HYSTEROSCOPY W/  RESECTION UTERINE TUMOR/FIBROID;  Surgeon: Yardlie Toussaint-Foster, DO;  Location: [facility] OR;  Service: Gynecology     Social History     Tobacco Use    Smoking status: Never    Smokeless tobacco: Never   Vaping Use    Vaping status: Never Used   Substance Use Topics    Alcohol use: Never    Drug use: Never       Nursing notes reviewed  Physical Exam:  ED Triage Vitals [12/05/24 0641]   Temperature Pulse Respirations Blood Pressure SpO2   98.3 °F (36.8 °C) (S) (!) 107 18 113/66 98 %      Temp Source Heart Rate Source Patient Position - Orthostatic VS BP Location FiO2 (%)   Oral Monitor Sitting Right arm --      Pain Score       10 - Worst Possible Pain           ROS: Positive for  fever, cough, headache, fatigue, myalgias, diarrhea, nausea, and vomiting, the remainder of a 10 organ system ROS was otherwise unremarkable.  General: awake, alert, no acute distress    Head: normocephalic, atraumatic    Eyes: no scleral icterus  Ears: external ears normal, hearing grossly intact  Nose: external exam grossly normal, positive nasal discharge  Neck: symmetric, No JVD noted, trachea midline  Pulmonary: no respiratory distress, no tachypnea noted  Cardiovascular: appears well perfused  Abdomen: no distention noted  Musculoskeletal: no deformities noted,  tone normal  Neuro: grossly non-focal  Psych: mood and affect appropriate    The patient is stable and has a history and physical exam consistent with a viral illness. COVID19 testing has been performed.  Will rule out pregnancy in this young female as well.  I considered the patient's other medical conditions as applicable/noted above in my medical decision making.  The patient is stable upon discharge. The plan is for supportive care at home.    The patient (and any family present) verbalized understanding of the discharge instructions and warnings that would necessitate return to the Emergency Department.  All questions were answered prior to discharge.    Medications   ondansetron (ZOFRAN-ODT) dispersible tablet 4 mg (has no administration in time range)   acetaminophen (TYLENOL) tablet 975 mg (has no administration in time range)   ketorolac (TORADOL) injection 30 mg (30 mg Intramuscular Given 12/5/24 0725)     Final diagnoses:   Flu-like symptoms     Time reflects when diagnosis was documented in both MDM as applicable and the Disposition within this note       Time User Action Codes Description Comment    12/5/2024  7:16 AM Jocelyn Jones [R68.89] Flu-like symptoms           ED Disposition       ED Disposition   Discharge    Condition   Stable    Date/Time   Thu Dec 5, 2024  7:16 AM    Comment   Suzanne Lewis discharge to home/self care.                   Follow-up Information    None       Patient's Medications   Discharge Prescriptions    ACETAMINOPHEN (TYLENOL) 650 MG CR TABLET    Take 1 tablet (650 mg total) by mouth every 8 (eight) hours as needed for mild pain       Start Date: 12/5/2024 End Date: --       Order Dose: 650 mg       Quantity: 30 tablet    Refills: 0    BENZONATATE (TESSALON PERLES) 100 MG CAPSULE    Take 1 capsule (100 mg total) by mouth every 8 (eight) hours       Start Date: 12/5/2024 End Date: --       Order Dose: 100 mg       Quantity: 21 capsule    Refills: 0     FLUTICASONE (FLONASE) 50 MCG/ACT NASAL SPRAY    1 spray into each nostril daily       Start Date: 12/5/2024 End Date: --       Order Dose: 1 spray       Quantity: 16 g    Refills: 0    NAPROXEN (NAPROSYN) 500 MG TABLET    Take 1 tablet (500 mg total) by mouth 2 (two) times a day with meals       Start Date: 12/5/2024 End Date: --       Order Dose: 500 mg       Quantity: 30 tablet    Refills: 0    ONDANSETRON (ZOFRAN-ODT) 4 MG DISINTEGRATING TABLET    Take 1 tablet (4 mg total) by mouth every 6 (six) hours as needed for nausea or vomiting       Start Date: 12/5/2024 End Date: --       Order Dose: 4 mg       Quantity: 20 tablet    Refills: 0     No discharge procedures on file.    Electronically Signed by       Jocelyn Jones MD  12/05/24 0727

## 2024-12-06 ENCOUNTER — RESULTS FOLLOW-UP (OUTPATIENT)
Dept: EMERGENCY DEPT | Facility: HOSPITAL | Age: 34
End: 2024-12-06

## 2025-02-05 ENCOUNTER — APPOINTMENT (EMERGENCY)
Dept: CT IMAGING | Facility: HOSPITAL | Age: 35
End: 2025-02-05

## 2025-02-05 ENCOUNTER — HOSPITAL ENCOUNTER (EMERGENCY)
Facility: HOSPITAL | Age: 35
Discharge: HOME | End: 2025-02-05
Attending: EMERGENCY MEDICINE

## 2025-02-05 VITALS
OXYGEN SATURATION: 98 % | HEART RATE: 71 BPM | TEMPERATURE: 97.9 F | RESPIRATION RATE: 18 BRPM | WEIGHT: 139.11 LBS | DIASTOLIC BLOOD PRESSURE: 54 MMHG | SYSTOLIC BLOOD PRESSURE: 105 MMHG | BODY MASS INDEX: 28.1 KG/M2

## 2025-02-05 DIAGNOSIS — R10.9 ABDOMINAL PAIN: Primary | ICD-10-CM

## 2025-02-05 LAB
ALBUMIN SERPL BCG-MCNC: 3.8 G/DL (ref 3.5–5)
ALP SERPL-CCNC: 83 U/L (ref 34–104)
ALT SERPL W P-5'-P-CCNC: 61 U/L (ref 7–52)
ANION GAP SERPL CALCULATED.3IONS-SCNC: 4 MMOL/L (ref 4–13)
AST SERPL W P-5'-P-CCNC: 32 U/L (ref 13–39)
BASOPHILS # BLD AUTO: 0.04 THOUSANDS/ΜL (ref 0–0.1)
BASOPHILS NFR BLD AUTO: 1 % (ref 0–1)
BILIRUB SERPL-MCNC: 0.44 MG/DL (ref 0.2–1)
BILIRUB UR QL STRIP: NEGATIVE
BUN SERPL-MCNC: 7 MG/DL (ref 5–25)
CALCIUM SERPL-MCNC: 8.4 MG/DL (ref 8.4–10.2)
CHLORIDE SERPL-SCNC: 108 MMOL/L (ref 96–108)
CLARITY UR: CLEAR
CO2 SERPL-SCNC: 24 MMOL/L (ref 21–32)
COLOR UR: YELLOW
CREAT SERPL-MCNC: 0.56 MG/DL (ref 0.6–1.3)
EOSINOPHIL # BLD AUTO: 0.12 THOUSAND/ΜL (ref 0–0.61)
EOSINOPHIL NFR BLD AUTO: 2 % (ref 0–6)
ERYTHROCYTE [DISTWIDTH] IN BLOOD BY AUTOMATED COUNT: 13.5 % (ref 11.6–15.1)
EXT PREGNANCY TEST URINE: NEGATIVE
EXT. CONTROL: NORMAL
GFR SERPL CREATININE-BSD FRML MDRD: 122 ML/MIN/1.73SQ M
GLUCOSE SERPL-MCNC: 83 MG/DL (ref 65–140)
GLUCOSE UR STRIP-MCNC: NEGATIVE MG/DL
HCT VFR BLD AUTO: 41.3 % (ref 34.8–46.1)
HGB BLD-MCNC: 15.4 G/DL (ref 11.5–15.4)
HGB UR QL STRIP.AUTO: NEGATIVE
IMM GRANULOCYTES # BLD AUTO: 0.02 THOUSAND/UL (ref 0–0.2)
IMM GRANULOCYTES NFR BLD AUTO: 0 % (ref 0–2)
KETONES UR STRIP-MCNC: NEGATIVE MG/DL
LEUKOCYTE ESTERASE UR QL STRIP: NEGATIVE
LIPASE SERPL-CCNC: 24 U/L (ref 11–82)
LYMPHOCYTES # BLD AUTO: 2.98 THOUSANDS/ΜL (ref 0.6–4.47)
LYMPHOCYTES NFR BLD AUTO: 51 % (ref 14–44)
MCH RBC QN AUTO: 30.7 PG (ref 26.8–34.3)
MCHC RBC AUTO-ENTMCNC: 37.3 G/DL (ref 31.4–37.4)
MCV RBC AUTO: 82 FL (ref 82–98)
MONOCYTES # BLD AUTO: 0.28 THOUSAND/ΜL (ref 0.17–1.22)
MONOCYTES NFR BLD AUTO: 5 % (ref 4–12)
NEUTROPHILS # BLD AUTO: 2.4 THOUSANDS/ΜL (ref 1.85–7.62)
NEUTS SEG NFR BLD AUTO: 41 % (ref 43–75)
NITRITE UR QL STRIP: NEGATIVE
NRBC BLD AUTO-RTO: 0 /100 WBCS
PH UR STRIP.AUTO: 6.5 [PH] (ref 4.5–8)
PLATELET # BLD AUTO: 312 THOUSANDS/UL (ref 149–390)
PMV BLD AUTO: 10.6 FL (ref 8.9–12.7)
POTASSIUM SERPL-SCNC: 3.8 MMOL/L (ref 3.5–5.3)
PROT SERPL-MCNC: 6.9 G/DL (ref 6.4–8.4)
PROT UR STRIP-MCNC: NEGATIVE MG/DL
RBC # BLD AUTO: 5.01 MILLION/UL (ref 3.81–5.12)
SODIUM SERPL-SCNC: 136 MMOL/L (ref 135–147)
SP GR UR STRIP.AUTO: 1.01 (ref 1–1.03)
UROBILINOGEN UR QL STRIP.AUTO: 0.2 E.U./DL
WBC # BLD AUTO: 5.84 THOUSAND/UL (ref 4.31–10.16)

## 2025-02-05 PROCEDURE — 99285 EMERGENCY DEPT VISIT HI MDM: CPT | Performed by: PHYSICIAN ASSISTANT

## 2025-02-05 PROCEDURE — 83690 ASSAY OF LIPASE: CPT

## 2025-02-05 PROCEDURE — 96374 THER/PROPH/DIAG INJ IV PUSH: CPT

## 2025-02-05 PROCEDURE — 87510 GARDNER VAG DNA DIR PROBE: CPT | Performed by: PHYSICIAN ASSISTANT

## 2025-02-05 PROCEDURE — 80053 COMPREHEN METABOLIC PANEL: CPT

## 2025-02-05 PROCEDURE — 99284 EMERGENCY DEPT VISIT MOD MDM: CPT

## 2025-02-05 PROCEDURE — 36415 COLL VENOUS BLD VENIPUNCTURE: CPT

## 2025-02-05 PROCEDURE — 87660 TRICHOMONAS VAGIN DIR PROBE: CPT | Performed by: PHYSICIAN ASSISTANT

## 2025-02-05 PROCEDURE — 96375 TX/PRO/DX INJ NEW DRUG ADDON: CPT

## 2025-02-05 PROCEDURE — 85025 COMPLETE CBC W/AUTO DIFF WBC: CPT

## 2025-02-05 PROCEDURE — 96361 HYDRATE IV INFUSION ADD-ON: CPT

## 2025-02-05 PROCEDURE — 81003 URINALYSIS AUTO W/O SCOPE: CPT

## 2025-02-05 PROCEDURE — 87480 CANDIDA DNA DIR PROBE: CPT | Performed by: PHYSICIAN ASSISTANT

## 2025-02-05 PROCEDURE — 74177 CT ABD & PELVIS W/CONTRAST: CPT

## 2025-02-05 PROCEDURE — 81025 URINE PREGNANCY TEST: CPT

## 2025-02-05 RX ORDER — KETOROLAC TROMETHAMINE 30 MG/ML
15 INJECTION, SOLUTION INTRAMUSCULAR; INTRAVENOUS ONCE
Status: COMPLETED | OUTPATIENT
Start: 2025-02-05 | End: 2025-02-05

## 2025-02-05 RX ORDER — HYDROMORPHONE HCL/PF 1 MG/ML
1 SYRINGE (ML) INJECTION ONCE
Refills: 0 | Status: COMPLETED | OUTPATIENT
Start: 2025-02-05 | End: 2025-02-05

## 2025-02-05 RX ORDER — NAPROXEN 500 MG/1
500 TABLET ORAL 2 TIMES DAILY PRN
Qty: 20 TABLET | Refills: 0 | Status: SHIPPED | OUTPATIENT
Start: 2025-02-05

## 2025-02-05 RX ADMIN — HYDROMORPHONE HYDROCHLORIDE 1 MG: 1 INJECTION, SOLUTION INTRAMUSCULAR; INTRAVENOUS; SUBCUTANEOUS at 14:41

## 2025-02-05 RX ADMIN — KETOROLAC TROMETHAMINE 15 MG: 30 INJECTION, SOLUTION INTRAMUSCULAR; INTRAVENOUS at 12:38

## 2025-02-05 RX ADMIN — IOHEXOL 100 ML: 350 INJECTION, SOLUTION INTRAVENOUS at 18:12

## 2025-02-05 RX ADMIN — SODIUM CHLORIDE 1000 ML: 0.9 INJECTION, SOLUTION INTRAVENOUS at 12:37

## 2025-02-05 NOTE — ED PROVIDER NOTES
ED Disposition       None          Assessment & Plan       Medical Decision Making  34y.o female presents to the ER for RLQ pain for 1 week. Vitals are stable. Patient is in no acute distress. On exam, breathing is non-labored. No tachypnea or accessory muscle use. Lungs are clear. Heart is regular rate and rhythm. Abdomen is soft and tender in the RLQ. No guarding, rigidity, distention or pulsatile masses palpated. DDX consists of but not limited to: appendicitis, UTI, pyelonephritis, abdominal pathology. Will check labs and imaging.    1233 WBC: 5.84    1233 Hemoglobin: 15.4    1233 Platelet Count: 312    1405 Urine Macroscopic, POC    1405 PREGNANCY TEST URINE: Negative    1700 Labs continue to be hemolyzed. RN is attempting again.    1738 LIPASE: 24    1738 Comprehensive metabolic panel(!)    1845      Patient signed out to Sawyer Caro PA-C awaiting imaging. Patient is stable.      Amount and/or Complexity of Data Reviewed  Independent Historian:      Details: Patient is historian  Labs: ordered. Decision-making details documented in ED Course.  Radiology: ordered.    Risk  Prescription drug management.        ED Course as of 02/05/25 1850   Wed Feb 05, 2025   1233 WBC: 5.84   1233 Hemoglobin: 15.4   1233 Platelet Count: 312   1405 Urine Macroscopic, POC   1405 PREGNANCY TEST URINE: Negative   1700 Labs continue to be hemolyzed. RN is attempting again.   1738 LIPASE: 24   1738 Comprehensive metabolic panel(!)       Medications   sodium chloride 0.9 % bolus 1,000 mL (0 mL Intravenous Stopped 2/5/25 1337)   ketorolac (TORADOL) injection 15 mg (15 mg Intravenous Given 2/5/25 1238)   HYDROmorphone (DILAUDID) injection 1 mg (1 mg Intravenous Given 2/5/25 1441)   iohexol (OMNIPAQUE) 350 MG/ML injection (MULTI-DOSE) 100 mL (100 mL Intravenous Given 2/5/25 1812)       ED Risk Strat Scores                          SBIRT 22yo+      Flowsheet Row Most Recent Value   Initial Alcohol Screen: US AUDIT-C     1. How  often do you have a drink containing alcohol? 0 Filed at: 02/05/2025 1142   2. How many drinks containing alcohol do you have on a typical day you are drinking?  0 Filed at: 02/05/2025 1142   3b. FEMALE Any Age, or MALE 65+: How often do you have 4 or more drinks on one occassion? 0 Filed at: 02/05/2025 1142   Audit-C Score 0 Filed at: 02/05/2025 1142   DARYL: How many times in the past year have you...    Used an illegal drug or used a prescription medication for non-medical reasons? Never Filed at: 02/05/2025 1142                            History of Present Illness       Chief Complaint   Patient presents with    Abdominal Pain     RLQ pain for last wk, increased pain & nausea        Past Medical History:   Diagnosis Date    Wears contact lenses       Past Surgical History:   Procedure Laterality Date    KNEE SURGERY Left     MD HYSTEROSCOPY BX ENDOMETRIUM&/POLYPC W/WO D&C N/A 12/1/2023    Procedure: HYSTEROSCOPY W/  RESECTION UTERINE TUMOR/FIBROID;  Surgeon: Yardlie Toussaint-Foster, DO;  Location: [facility] OR;  Service: Gynecology      History reviewed. No pertinent family history.   Social History     Tobacco Use    Smoking status: Never    Smokeless tobacco: Never   Vaping Use    Vaping status: Never Used   Substance Use Topics    Alcohol use: Never    Drug use: Never      E-Cigarette/Vaping    E-Cigarette Use Never User       E-Cigarette/Vaping Substances    Nicotine No     THC No     CBD No     Flavoring No     Other No     Unknown No       I have reviewed and agree with the history as documented.     34y.o female with no significant PMH presents to the ER for RLQ pain for 1 week. Patient reports none of the medication she has taken has helped with symptoms. She describes her pain as burning and non-radiating. Symptoms are constant. She denies fever, chills, URI symptoms, chest pain, dyspnea, vomiting, diarrhea, hematuria, dysuria, weakness or paresthesias. She does report nausea, frequency and urgency.        History provided by:  Patient   used: No        Review of Systems   Constitutional:  Negative for activity change, appetite change, chills and fever.   HENT:  Negative for congestion, drooling, ear discharge, ear pain, facial swelling, rhinorrhea and sore throat.    Eyes:  Negative for redness.   Respiratory:  Negative for cough and shortness of breath.    Cardiovascular:  Negative for chest pain.   Gastrointestinal:  Positive for abdominal pain and nausea. Negative for diarrhea and vomiting.   Genitourinary:  Positive for frequency and urgency. Negative for dysuria and vaginal bleeding.   Musculoskeletal:  Negative for neck stiffness.   Skin:  Negative for rash.   Allergic/Immunologic: Negative for food allergies.   Neurological:  Negative for weakness and numbness.           Objective       ED Triage Vitals   Temperature Pulse Blood Pressure Respirations SpO2 Patient Position - Orthostatic VS   02/05/25 1142 02/05/25 1142 02/05/25 1142 02/05/25 1142 02/05/25 1142 02/05/25 1142   97.9 °F (36.6 °C) 100 139/73 18 99 % Sitting      Temp Source Heart Rate Source BP Location FiO2 (%) Pain Score    02/05/25 1142 02/05/25 1142 02/05/25 1142 -- 02/05/25 1238    Oral Monitor Right arm  8      Vitals      Date and Time Temp Pulse SpO2 Resp BP Pain Score FACES Pain Rating User   02/05/25 1800 -- 71 98 % 18 105/54 -- --    02/05/25 1700 -- 71 99 % 18 110/62 -- --    02/05/25 1608 -- 74 100 % 18 113/65 -- --    02/05/25 1441 -- -- -- -- -- 8 --    02/05/25 1430 -- -- -- -- -- 8 --    02/05/25 1345 -- -- -- -- -- No Pain --    02/05/25 1238 -- -- -- -- -- 8 --    02/05/25 1142 97.9 °F (36.6 °C) 100 99 % 18 139/73 -- -- RAMON            Physical Exam  Vitals and nursing note reviewed.   Constitutional:       General: She is not in acute distress.     Appearance: She is not toxic-appearing.   HENT:      Head: Normocephalic and atraumatic.      Mouth/Throat:      Lips: Pink. No lesions.       Mouth: Mucous membranes are moist.   Eyes:      Conjunctiva/sclera: Conjunctivae normal.   Neck:      Trachea: No tracheal deviation.   Cardiovascular:      Rate and Rhythm: Normal rate and regular rhythm.      Heart sounds: Normal heart sounds, S1 normal and S2 normal. No murmur heard.     No friction rub. No gallop.   Pulmonary:      Effort: Pulmonary effort is normal. No respiratory distress.      Breath sounds: Normal breath sounds. No decreased breath sounds, wheezing, rhonchi or rales.   Chest:      Chest wall: No tenderness.   Abdominal:      General: Bowel sounds are normal. There is no distension.      Palpations: Abdomen is soft.      Tenderness: There is abdominal tenderness in the right lower quadrant. There is no guarding or rebound.   Musculoskeletal:      Cervical back: Normal range of motion and neck supple.   Skin:     General: Skin is warm and dry.      Findings: No rash.   Neurological:      Mental Status: She is alert.      GCS: GCS eye subscore is 4. GCS verbal subscore is 5. GCS motor subscore is 6.   Psychiatric:         Mood and Affect: Mood normal.         Results Reviewed       Procedure Component Value Units Date/Time    Comprehensive metabolic panel [[id number]]  (Abnormal) Collected: 02/05/25 1656    Lab Status: Final result Specimen: Blood from Arm, Right Updated: 02/05/25 5922     Sodium 136 mmol/L      Potassium 3.8 mmol/L      Chloride 108 mmol/L      CO2 24 mmol/L      ANION GAP 4 mmol/L      BUN 7 mg/dL      Creatinine 0.56 mg/dL      Glucose 83 mg/dL      Calcium 8.4 mg/dL      AST 32 U/L      ALT 61 U/L      Alkaline Phosphatase 83 U/L      Total Protein 6.9 g/dL      Albumin 3.8 g/dL      Total Bilirubin 0.44 mg/dL      eGFR 122 ml/min/1.73sq m     Narrative:      [employer] guidelines for Chronic Kidney Disease (CKD):     Stage 1 with normal or high GFR (GFR > 90 mL/min/1.73 square meters)    Stage 2 Mild CKD (GFR = 60-89 mL/min/1.73 square meters)     Stage 3A Moderate CKD (GFR = 45-59 mL/min/1.73 square meters)    Stage 3B Moderate CKD (GFR = 30-44 mL/min/1.73 square meters)    Stage 4 Severe CKD (GFR = 15-29 mL/min/1.73 square meters)    Stage 5 End Stage CKD (GFR <15 mL/min/1.73 square meters)  Note: GFR calculation is accurate only with a steady state creatinine    Lipase [[id number]]  (Normal) Collected: 02/05/25 1659    Lab Status: Final result Specimen: Blood from Arm, Right Updated: 02/05/25 1737     Lipase 24 u/L     VAGINITIS/VAGINOSIS, DNA PROBE [[id number]] Collected: 02/05/25 1403    Lab Status: In process Specimen: Genital from Vaginal Updated: 02/05/25 1406    Narrative:      The following orders were created for panel order VAGINITIS/VAGINOSIS, DNA PROBE.  Procedure                               Abnormality         Status                     ---------                               -----------         ------                     Vaginosis DNA Probe[[id number]]                              In process                   Please view results for these tests on the individual orders.    Vaginosis DNA Probe [[id number]] Collected: 02/05/25 1403    Lab Status: In process Specimen: Genital from Vaginal Updated: 02/05/25 1406    POCT pregnancy, urine [[id number]]  (Normal) Collected: 02/05/25 1402    Lab Status: Final result Specimen: Urine Updated: 02/05/25 1402     EXT Preg Test, Ur Negative     Control Valid    Urine Macroscopic, POC [[id number]] Collected: 02/05/25 1400    Lab Status: Final result Specimen: Urine Updated: 02/05/25 1401     Color, UA Yellow     Clarity, UA Clear     pH, UA 6.5     Leukocytes, UA Negative     Nitrite, UA Negative     Protein, UA Negative mg/dl      Glucose, UA Negative mg/dl      Ketones, UA Negative mg/dl      Urobilinogen, UA 0.2 E.U./dl      Bilirubin, UA Negative     Occult Blood, UA Negative     Specific Gravity, UA 1.015    Narrative:      CLINITEK RESULT    CBC and differential [[id number]]  (Abnormal) Collected: 02/05/25  1200    Lab Status: Final result Specimen: Blood from Arm, Right Updated: 25 1204     WBC 5.84 Thousand/uL      RBC 5.01 Million/uL      Hemoglobin 15.4 g/dL      Hematocrit 41.3 %      MCV 82 fL      MCH 30.7 pg      MCHC 37.3 g/dL      RDW 13.5 %      MPV 10.6 fL      Platelets 312 Thousands/uL      nRBC 0 /100 WBCs      Segmented % 41 %      Immature Grans % 0 %      Lymphocytes % 51 %      Monocytes % 5 %      Eosinophils Relative 2 %      Basophils Relative 1 %      Absolute Neutrophils 2.40 Thousands/µL      Absolute Immature Grans 0.02 Thousand/uL      Absolute Lymphocytes 2.98 Thousands/µL      Absolute Monocytes 0.28 Thousand/µL      Eosinophils Absolute 0.12 Thousand/µL      Basophils Absolute 0.04 Thousands/µL             CT abdomen pelvis with contrast    (Results Pending)       Procedures    ED Medication and Procedure Management   Prior to Admission Medications   Prescriptions Last Dose Informant Patient Reported? Taking?   acetaminophen (TYLENOL) 650 mg CR tablet   No No   Sig: Take 1 tablet (650 mg total) by mouth every 8 (eight) hours as needed for mild pain   benzonatate (TESSALON PERLES) 100 mg capsule   No No   Sig: Take 1 capsule (100 mg total) by mouth every 8 (eight) hours   fluticasone (FLONASE) 50 mcg/act nasal spray   No No   Si spray into each nostril daily   ibuprofen (MOTRIN) 400 mg tablet   No No   Sig: Take 1 tablet (400 mg total) by mouth every 8 (eight) hours as needed for mild pain or moderate pain   ibuprofen (MOTRIN) 600 mg tablet   No No   Sig: Take 1 tablet (600 mg total) by mouth every 6 (six) hours as needed for mild pain   miSOPROStol (Cytotec) 200 mcg tablet   No No   Sig: [place] yaz tableta por via oral la noche anterior al precedimiento luego tome un inserto de tableta por via intravaginal la manana del procedimiento a las 5:30am   Patient not taking: Reported on 2023   naproxen (Naprosyn) 500 mg tablet   No No   Sig: Take 1 tablet (500 mg total) by mouth 2  (two) times a day with meals   ondansetron (ZOFRAN-ODT) 4 mg disintegrating tablet   No No   Sig: Take 1 tablet (4 mg total) by mouth every 6 (six) hours as needed for nausea or vomiting      Facility-Administered Medications: None     Patient's Medications   Discharge Prescriptions    No medications on file     No discharge procedures on file.  ED SEPSIS DOCUMENTATION            Modesta Mojica PA-C  02/05/25 5129

## 2025-02-05 NOTE — Clinical Note
Suzanne Lewis was seen and treated in our emergency department on 2/5/2025.                Diagnosis:     Suzanne  may return to work on return date.    She may return on this date: 02/07/2025         If you have any questions or concerns, please don't hesitate to call.      Sawyer Caro PA-C    ______________________________           _______________          _______________  Hospital Representative                              Date                                Time

## 2025-02-06 LAB
CANDIDA RRNA VAG QL PROBE: NOT DETECTED
G VAGINALIS RRNA GENITAL QL PROBE: NOT DETECTED
T VAGINALIS RRNA GENITAL QL PROBE: NOT DETECTED

## (undated) DEVICE — PVC URETHRAL CATHETER: Brand: DOVER

## (undated) DEVICE — STERILE 8 INCH PROCTO SWAB: Brand: CARDINAL HEALTH

## (undated) DEVICE — PREMIUM DRY TRAY LF: Brand: MEDLINE INDUSTRIES, INC.

## (undated) DEVICE — STRL ALLENTOWN HYSTEROSCOPY PK: Brand: CARDINAL HEALTH

## (undated) DEVICE — LIGHT GLOVE GREEN

## (undated) DEVICE — SCD SEQUENTIAL COMPRESSION COMFORT SLEEVE MEDIUM KNEE LENGTH: Brand: KENDALL SCD

## (undated) DEVICE — PACK FLUENT DISP

## (undated) DEVICE — GLOVE INDICATOR PI UNDERGLOVE SZ 6.5 BLUE

## (undated) DEVICE — GLOVE PI ULTRA TOUCH SZ 6